# Patient Record
Sex: MALE | Race: ASIAN | NOT HISPANIC OR LATINO | Employment: OTHER | URBAN - METROPOLITAN AREA
[De-identification: names, ages, dates, MRNs, and addresses within clinical notes are randomized per-mention and may not be internally consistent; named-entity substitution may affect disease eponyms.]

---

## 2023-10-16 ENCOUNTER — HOSPITAL ENCOUNTER (INPATIENT)
Facility: CLINIC | Age: 88
LOS: 2 days | Discharge: HOME HEALTH | DRG: 871 | End: 2023-10-19
Attending: STUDENT IN AN ORGANIZED HEALTH CARE EDUCATION/TRAINING PROGRAM | Admitting: INTERNAL MEDICINE
Payer: COMMERCIAL

## 2023-10-16 ENCOUNTER — APPOINTMENT (OUTPATIENT)
Dept: CT IMAGING | Facility: CLINIC | Age: 88
DRG: 871 | End: 2023-10-16
Attending: STUDENT IN AN ORGANIZED HEALTH CARE EDUCATION/TRAINING PROGRAM
Payer: COMMERCIAL

## 2023-10-16 ENCOUNTER — APPOINTMENT (OUTPATIENT)
Dept: GENERAL RADIOLOGY | Facility: CLINIC | Age: 88
DRG: 871 | End: 2023-10-16
Attending: STUDENT IN AN ORGANIZED HEALTH CARE EDUCATION/TRAINING PROGRAM
Payer: COMMERCIAL

## 2023-10-16 DIAGNOSIS — R53.1 WEAKNESS GENERALIZED: ICD-10-CM

## 2023-10-16 DIAGNOSIS — U07.1 COVID-19: Primary | ICD-10-CM

## 2023-10-16 DIAGNOSIS — R78.81 GRAM-POSITIVE BACTEREMIA: ICD-10-CM

## 2023-10-16 PROBLEM — R32 URINARY INCONTINENCE: Status: ACTIVE | Noted: 2023-10-16

## 2023-10-16 LAB
ALBUMIN SERPL BCG-MCNC: 3.9 G/DL (ref 3.5–5.2)
ALP SERPL-CCNC: 79 U/L (ref 40–129)
ALT SERPL W P-5'-P-CCNC: 26 U/L (ref 0–70)
ANION GAP SERPL CALCULATED.3IONS-SCNC: 13 MMOL/L (ref 7–15)
AST SERPL W P-5'-P-CCNC: 29 U/L (ref 0–45)
ATRIAL RATE - MUSE: 107 BPM
BASO+EOS+MONOS # BLD AUTO: ABNORMAL 10*3/UL
BASO+EOS+MONOS NFR BLD AUTO: ABNORMAL %
BASOPHILS # BLD AUTO: 0 10E3/UL (ref 0–0.2)
BASOPHILS NFR BLD AUTO: 0 %
BILIRUB SERPL-MCNC: 0.4 MG/DL
BUN SERPL-MCNC: 30.2 MG/DL (ref 8–23)
CALCIUM SERPL-MCNC: 8.8 MG/DL (ref 8.2–9.6)
CHLORIDE SERPL-SCNC: 105 MMOL/L (ref 98–107)
CREAT SERPL-MCNC: 2.76 MG/DL (ref 0.67–1.17)
DEPRECATED HCO3 PLAS-SCNC: 21 MMOL/L (ref 22–29)
DIASTOLIC BLOOD PRESSURE - MUSE: NORMAL MMHG
EGFRCR SERPLBLD CKD-EPI 2021: 21 ML/MIN/1.73M2
EOSINOPHIL # BLD AUTO: 0 10E3/UL (ref 0–0.7)
EOSINOPHIL NFR BLD AUTO: 0 %
ERYTHROCYTE [DISTWIDTH] IN BLOOD BY AUTOMATED COUNT: 13.2 % (ref 10–15)
GLUCOSE BLDC GLUCOMTR-MCNC: 157 MG/DL (ref 70–99)
GLUCOSE BLDC GLUCOMTR-MCNC: 169 MG/DL (ref 70–99)
GLUCOSE SERPL-MCNC: 176 MG/DL (ref 70–99)
HCT VFR BLD AUTO: 37 % (ref 40–53)
HGB BLD-MCNC: 12.2 G/DL (ref 13.3–17.7)
HOLD SPECIMEN: NORMAL
IMM GRANULOCYTES # BLD: 0.1 10E3/UL
IMM GRANULOCYTES NFR BLD: 1 %
INTERPRETATION ECG - MUSE: NORMAL
LACTATE SERPL-SCNC: 1.8 MMOL/L (ref 0.7–2)
LYMPHOCYTES # BLD AUTO: 0.9 10E3/UL (ref 0.8–5.3)
LYMPHOCYTES NFR BLD AUTO: 9 %
MAGNESIUM SERPL-MCNC: 2.3 MG/DL (ref 1.7–2.3)
MCH RBC QN AUTO: 29.9 PG (ref 26.5–33)
MCHC RBC AUTO-ENTMCNC: 33 G/DL (ref 31.5–36.5)
MCV RBC AUTO: 91 FL (ref 78–100)
MONOCYTES # BLD AUTO: 0.7 10E3/UL (ref 0–1.3)
MONOCYTES NFR BLD AUTO: 8 %
NEUTROPHILS # BLD AUTO: 7.9 10E3/UL (ref 1.6–8.3)
NEUTROPHILS NFR BLD AUTO: 82 %
NRBC # BLD AUTO: 0 10E3/UL
NRBC BLD AUTO-RTO: 0 /100
P AXIS - MUSE: 51 DEGREES
PLATELET # BLD AUTO: 161 10E3/UL (ref 150–450)
POTASSIUM SERPL-SCNC: 5 MMOL/L (ref 3.4–5.3)
PR INTERVAL - MUSE: 192 MS
PROT SERPL-MCNC: 7.9 G/DL (ref 6.4–8.3)
QRS DURATION - MUSE: 70 MS
QT - MUSE: 286 MS
QTC - MUSE: 381 MS
R AXIS - MUSE: 54 DEGREES
RBC # BLD AUTO: 4.08 10E6/UL (ref 4.4–5.9)
SODIUM SERPL-SCNC: 139 MMOL/L (ref 135–145)
SYSTOLIC BLOOD PRESSURE - MUSE: NORMAL MMHG
T AXIS - MUSE: 26 DEGREES
TROPONIN T SERPL HS-MCNC: 32 NG/L
TROPONIN T SERPL HS-MCNC: 36 NG/L
VENTRICULAR RATE- MUSE: 107 BPM
WBC # BLD AUTO: 9.6 10E3/UL (ref 4–11)

## 2023-10-16 PROCEDURE — 250N000012 HC RX MED GY IP 250 OP 636 PS 637: Performed by: PHYSICIAN ASSISTANT

## 2023-10-16 PROCEDURE — 84484 ASSAY OF TROPONIN QUANT: CPT | Performed by: STUDENT IN AN ORGANIZED HEALTH CARE EDUCATION/TRAINING PROGRAM

## 2023-10-16 PROCEDURE — 83605 ASSAY OF LACTIC ACID: CPT | Performed by: STUDENT IN AN ORGANIZED HEALTH CARE EDUCATION/TRAINING PROGRAM

## 2023-10-16 PROCEDURE — 96372 THER/PROPH/DIAG INJ SC/IM: CPT | Performed by: PHYSICIAN ASSISTANT

## 2023-10-16 PROCEDURE — 99285 EMERGENCY DEPT VISIT HI MDM: CPT | Mod: 25

## 2023-10-16 PROCEDURE — 71046 X-RAY EXAM CHEST 2 VIEWS: CPT

## 2023-10-16 PROCEDURE — 99223 1ST HOSP IP/OBS HIGH 75: CPT | Performed by: PHYSICIAN ASSISTANT

## 2023-10-16 PROCEDURE — 96361 HYDRATE IV INFUSION ADD-ON: CPT

## 2023-10-16 PROCEDURE — G0378 HOSPITAL OBSERVATION PER HR: HCPCS

## 2023-10-16 PROCEDURE — 83735 ASSAY OF MAGNESIUM: CPT | Performed by: STUDENT IN AN ORGANIZED HEALTH CARE EDUCATION/TRAINING PROGRAM

## 2023-10-16 PROCEDURE — 93005 ELECTROCARDIOGRAM TRACING: CPT

## 2023-10-16 PROCEDURE — 80053 COMPREHEN METABOLIC PANEL: CPT | Performed by: STUDENT IN AN ORGANIZED HEALTH CARE EDUCATION/TRAINING PROGRAM

## 2023-10-16 PROCEDURE — 87149 DNA/RNA DIRECT PROBE: CPT | Performed by: STUDENT IN AN ORGANIZED HEALTH CARE EDUCATION/TRAINING PROGRAM

## 2023-10-16 PROCEDURE — 87077 CULTURE AEROBIC IDENTIFY: CPT | Performed by: STUDENT IN AN ORGANIZED HEALTH CARE EDUCATION/TRAINING PROGRAM

## 2023-10-16 PROCEDURE — 70450 CT HEAD/BRAIN W/O DYE: CPT

## 2023-10-16 PROCEDURE — 250N000011 HC RX IP 250 OP 636: Performed by: PHYSICIAN ASSISTANT

## 2023-10-16 PROCEDURE — 85025 COMPLETE CBC W/AUTO DIFF WBC: CPT | Performed by: STUDENT IN AN ORGANIZED HEALTH CARE EDUCATION/TRAINING PROGRAM

## 2023-10-16 PROCEDURE — 36415 COLL VENOUS BLD VENIPUNCTURE: CPT | Performed by: STUDENT IN AN ORGANIZED HEALTH CARE EDUCATION/TRAINING PROGRAM

## 2023-10-16 PROCEDURE — 82962 GLUCOSE BLOOD TEST: CPT

## 2023-10-16 PROCEDURE — 250N000013 HC RX MED GY IP 250 OP 250 PS 637: Performed by: PHYSICIAN ASSISTANT

## 2023-10-16 PROCEDURE — 258N000003 HC RX IP 258 OP 636: Performed by: STUDENT IN AN ORGANIZED HEALTH CARE EDUCATION/TRAINING PROGRAM

## 2023-10-16 RX ORDER — IPRATROPIUM BROMIDE AND ALBUTEROL SULFATE 2.5; .5 MG/3ML; MG/3ML
3 SOLUTION RESPIRATORY (INHALATION) EVERY 4 HOURS PRN
Status: DISCONTINUED | OUTPATIENT
Start: 2023-10-16 | End: 2023-10-19 | Stop reason: HOSPADM

## 2023-10-16 RX ORDER — PANTOPRAZOLE SODIUM 40 MG/1
40 TABLET, DELAYED RELEASE ORAL DAILY
Status: DISCONTINUED | OUTPATIENT
Start: 2023-10-16 | End: 2023-10-19 | Stop reason: HOSPADM

## 2023-10-16 RX ORDER — GLIPIZIDE 2.5 MG/1
5 TABLET, EXTENDED RELEASE ORAL DAILY
Status: ON HOLD | COMMUNITY
End: 2024-09-25

## 2023-10-16 RX ORDER — NICOTINE POLACRILEX 4 MG
15-30 LOZENGE BUCCAL
Status: DISCONTINUED | OUTPATIENT
Start: 2023-10-16 | End: 2023-10-19 | Stop reason: HOSPADM

## 2023-10-16 RX ORDER — LIDOCAINE 40 MG/G
CREAM TOPICAL
Status: DISCONTINUED | OUTPATIENT
Start: 2023-10-16 | End: 2023-10-19 | Stop reason: HOSPADM

## 2023-10-16 RX ORDER — SIMVASTATIN 20 MG
20 TABLET ORAL AT BEDTIME
Status: ON HOLD | COMMUNITY
End: 2024-09-25

## 2023-10-16 RX ORDER — ACETAMINOPHEN 650 MG/1
650 SUPPOSITORY RECTAL EVERY 6 HOURS PRN
Status: DISCONTINUED | OUTPATIENT
Start: 2023-10-16 | End: 2023-10-19 | Stop reason: HOSPADM

## 2023-10-16 RX ORDER — POLYETHYLENE GLYCOL 3350 17 G/17G
17 POWDER, FOR SOLUTION ORAL DAILY PRN
Status: DISCONTINUED | OUTPATIENT
Start: 2023-10-16 | End: 2023-10-19 | Stop reason: HOSPADM

## 2023-10-16 RX ORDER — ONDANSETRON 2 MG/ML
4 INJECTION INTRAMUSCULAR; INTRAVENOUS EVERY 6 HOURS PRN
Status: DISCONTINUED | OUTPATIENT
Start: 2023-10-16 | End: 2023-10-19 | Stop reason: HOSPADM

## 2023-10-16 RX ORDER — ONDANSETRON 4 MG/1
4 TABLET, ORALLY DISINTEGRATING ORAL EVERY 6 HOURS PRN
Status: DISCONTINUED | OUTPATIENT
Start: 2023-10-16 | End: 2023-10-19 | Stop reason: HOSPADM

## 2023-10-16 RX ORDER — AMOXICILLIN 250 MG
2 CAPSULE ORAL 2 TIMES DAILY PRN
Status: DISCONTINUED | OUTPATIENT
Start: 2023-10-16 | End: 2023-10-19 | Stop reason: HOSPADM

## 2023-10-16 RX ORDER — CODEINE PHOSPHATE AND GUAIFENESIN 10; 100 MG/5ML; MG/5ML
5 SOLUTION ORAL
COMMUNITY

## 2023-10-16 RX ORDER — ACETAMINOPHEN 325 MG/1
650 TABLET ORAL EVERY 6 HOURS PRN
Status: DISCONTINUED | OUTPATIENT
Start: 2023-10-16 | End: 2023-10-19 | Stop reason: HOSPADM

## 2023-10-16 RX ORDER — HEPARIN SODIUM 5000 [USP'U]/.5ML
5000 INJECTION, SOLUTION INTRAVENOUS; SUBCUTANEOUS EVERY 12 HOURS
Status: DISCONTINUED | OUTPATIENT
Start: 2023-10-16 | End: 2023-10-19 | Stop reason: HOSPADM

## 2023-10-16 RX ORDER — AMOXICILLIN 250 MG
1 CAPSULE ORAL 2 TIMES DAILY PRN
Status: DISCONTINUED | OUTPATIENT
Start: 2023-10-16 | End: 2023-10-19 | Stop reason: HOSPADM

## 2023-10-16 RX ORDER — TAMSULOSIN HYDROCHLORIDE 0.4 MG/1
0.4 CAPSULE ORAL DAILY
COMMUNITY

## 2023-10-16 RX ORDER — AMLODIPINE BESYLATE 10 MG/1
10 TABLET ORAL DAILY
Status: ON HOLD | COMMUNITY
End: 2024-09-25

## 2023-10-16 RX ORDER — LOSARTAN POTASSIUM 50 MG/1
50 TABLET ORAL DAILY
Status: ON HOLD | COMMUNITY
End: 2023-10-19

## 2023-10-16 RX ORDER — TAMSULOSIN HYDROCHLORIDE 0.4 MG/1
0.4 CAPSULE ORAL DAILY
Status: DISCONTINUED | OUTPATIENT
Start: 2023-10-16 | End: 2023-10-19 | Stop reason: HOSPADM

## 2023-10-16 RX ORDER — CODEINE PHOSPHATE AND GUAIFENESIN 10; 100 MG/5ML; MG/5ML
5 SOLUTION ORAL
Status: DISCONTINUED | OUTPATIENT
Start: 2023-10-16 | End: 2023-10-19 | Stop reason: HOSPADM

## 2023-10-16 RX ORDER — POLYETHYLENE GLYCOL 3350 17 G/17G
1 POWDER, FOR SOLUTION ORAL DAILY PRN
COMMUNITY

## 2023-10-16 RX ORDER — LOSARTAN POTASSIUM 50 MG/1
50 TABLET ORAL DAILY
Status: DISCONTINUED | OUTPATIENT
Start: 2023-10-16 | End: 2023-10-19 | Stop reason: HOSPADM

## 2023-10-16 RX ORDER — GUAIFENESIN 600 MG/1
600 TABLET, EXTENDED RELEASE ORAL 2 TIMES DAILY
Status: DISCONTINUED | OUTPATIENT
Start: 2023-10-16 | End: 2023-10-19 | Stop reason: HOSPADM

## 2023-10-16 RX ORDER — ACETAMINOPHEN 500 MG
500-1000 TABLET ORAL EVERY 4 HOURS PRN
COMMUNITY

## 2023-10-16 RX ORDER — GLIPIZIDE 5 MG/1
5 TABLET, FILM COATED, EXTENDED RELEASE ORAL DAILY
Status: DISCONTINUED | OUTPATIENT
Start: 2023-10-16 | End: 2023-10-19 | Stop reason: HOSPADM

## 2023-10-16 RX ORDER — DEXTROSE MONOHYDRATE 25 G/50ML
25-50 INJECTION, SOLUTION INTRAVENOUS
Status: DISCONTINUED | OUTPATIENT
Start: 2023-10-16 | End: 2023-10-19 | Stop reason: HOSPADM

## 2023-10-16 RX ORDER — LANSOPRAZOLE 30 MG/1
30 CAPSULE, DELAYED RELEASE ORAL DAILY
COMMUNITY

## 2023-10-16 RX ORDER — AMLODIPINE BESYLATE 10 MG/1
10 TABLET ORAL DAILY
Status: DISCONTINUED | OUTPATIENT
Start: 2023-10-16 | End: 2023-10-19 | Stop reason: HOSPADM

## 2023-10-16 RX ADMIN — HEPARIN SODIUM 5000 UNITS: 5000 INJECTION, SOLUTION INTRAVENOUS; SUBCUTANEOUS at 16:16

## 2023-10-16 RX ADMIN — SODIUM CHLORIDE 1000 ML: 9 INJECTION, SOLUTION INTRAVENOUS at 08:31

## 2023-10-16 RX ADMIN — GLIPIZIDE 5 MG: 5 TABLET, FILM COATED, EXTENDED RELEASE ORAL at 16:16

## 2023-10-16 RX ADMIN — PANTOPRAZOLE SODIUM 40 MG: 40 TABLET, DELAYED RELEASE ORAL at 16:17

## 2023-10-16 RX ADMIN — GUAIFENESIN 600 MG: 600 TABLET, EXTENDED RELEASE ORAL at 22:52

## 2023-10-16 RX ADMIN — INSULIN ASPART 1 UNITS: 100 INJECTION, SOLUTION INTRAVENOUS; SUBCUTANEOUS at 19:30

## 2023-10-16 RX ADMIN — TAMSULOSIN HYDROCHLORIDE 0.4 MG: 0.4 CAPSULE ORAL at 16:16

## 2023-10-16 RX ADMIN — LOSARTAN POTASSIUM 50 MG: 50 TABLET, FILM COATED ORAL at 16:17

## 2023-10-16 RX ADMIN — DEXAMETHASONE 6 MG: 2 TABLET ORAL at 16:17

## 2023-10-16 RX ADMIN — AMLODIPINE BESYLATE 10 MG: 10 TABLET ORAL at 16:17

## 2023-10-16 ASSESSMENT — ACTIVITIES OF DAILY LIVING (ADL)
ADLS_ACUITY_SCORE: 33
ADLS_ACUITY_SCORE: 31
ADLS_ACUITY_SCORE: 33
ADLS_ACUITY_SCORE: 35
ADLS_ACUITY_SCORE: 33
ADLS_ACUITY_SCORE: 35
ADLS_ACUITY_SCORE: 35
ADLS_ACUITY_SCORE: 33

## 2023-10-16 NOTE — ED NOTES
Daughter went to cafeteria to get pt food. MD verified that its okay. Daughter says patient has no teeth and eats soft foods.

## 2023-10-16 NOTE — ED NOTES
Pt came in with daughter at bedside. Daughter is acting as spokesperson. Pt is legally blind and needs Bel[place]ian /daughter is providing. Pt has had cough, loss of appetite, memory issues, SOB. Pt had two unwitnessed falls. Daughter said Pt usually follows a string around the house but got lost and was found on the floor this morning.

## 2023-10-16 NOTE — ED NOTES
"[facility]  ED Nurse Handoff Report    ED Chief complaint: Generalized Weakness  . ED Diagnosis:   Final diagnoses:   Weakness generalized   COVID-19       Allergies:   Allergies   Allergen Reactions    Ibuprofen Other (See Comments)       Code Status: Full Code / None listed    Activity level - Baseline/Home:  independent and uses string to get around at home, \"legally blind\" .  Activity Level - Current:   assist of 1.   Lift room needed: No.   Bariatric: No   Needed: Yes   Isolation: Yes.   Infection: Not Applicable  COVID r/o and special precautions.     Respiratory status: Room air    Vital Signs (within 30 minutes):   Vitals:    10/16/23 0930 10/16/23 0944 10/16/23 0945 10/16/23 1004   BP:  117/72  106/70   Pulse: 105 103 102 100   Resp:       Temp:       TempSrc:       SpO2: 95% 96% 95% 94%   Weight:           Cardiac Rhythm:  ,   Cardiac  Cardiac Rhythm: Normal sinus rhythm  Pain level:    Patient confused: No.   Patient Falls Risk: patient and family education.   Elimination Status:  Has not voided      Patient Report - Initial Complaint: generalized weakness.   Focused Assessment: Cardiac, respiratory     Abnormal Results:   Labs Ordered and Resulted from Time of ED Arrival to Time of ED Departure   COMPREHENSIVE METABOLIC PANEL - Abnormal       Result Value    Sodium 139      Potassium 5.0      Carbon Dioxide (CO2) 21 (*)     Anion Gap 13      Urea Nitrogen 30.2 (*)     Creatinine 2.76 (*)     GFR Estimate 21 (*)     Calcium 8.8      Chloride 105      Glucose 176 (*)     Alkaline Phosphatase 79      AST 29      ALT 26      Protein Total 7.9      Albumin 3.9      Bilirubin Total 0.4     TROPONIN T, HIGH SENSITIVITY - Abnormal    Troponin T, High Sensitivity 36 (*)    CBC WITH PLATELETS AND DIFFERENTIAL - Abnormal    WBC Count 9.6      RBC Count 4.08 (*)     Hemoglobin 12.2 (*)     Hematocrit 37.0 (*)     MCV 91      MCH 29.9      MCHC 33.0      RDW 13.2      Platelet Count " 161      % Neutrophils 82      % Lymphocytes 9      % Monocytes 8      Mids % (Monos, Eos, Basos)        % Eosinophils 0      % Basophils 0      % Immature Granulocytes 1      NRBCs per 100 WBC 0      Absolute Neutrophils 7.9      Absolute Lymphocytes 0.9      Absolute Monocytes 0.7      Mids Abs (Monos, Eos, Basos)        Absolute Eosinophils 0.0      Absolute Basophils 0.0      Absolute Immature Granulocytes 0.1      Absolute NRBCs 0.0     MAGNESIUM - Normal    Magnesium 2.3     LACTIC ACID WHOLE BLOOD - Normal    Lactic Acid 1.8     TROPONIN T, HIGH SENSITIVITY   BLOOD CULTURE   BLOOD CULTURE        XR Chest 2 Views   Preliminary Result   IMPRESSION: No acute cardiopulmonary disease.      CT Head w/o Contrast   Final Result   IMPRESSION: Diffuse cerebral volume loss and cerebral white matter   changes consistent with chronic small vessel ischemic disease. No   evidence for acute intracranial pathology.         Radiation dose for this scan was reduced using automated exposure   control, adjustment of the mA and/or kV according to patient size, or   iterative reconstruction technique      OLIVIER CARLIN MD            SYSTEM ID:  [id number]          Treatments provided: See MAR  Family Comments: Daughter at bedside  OBS brochure/video discussed/provided to patient:  Yes  ED Medications:   Medications   sodium chloride (PF) 0.9% PF flush 3 mL (has no administration in time range)   sodium chloride (PF) 0.9% PF flush 3 mL (3 mLs Intracatheter Not Given 10/16/23 0831)   sodium chloride 0.9% BOLUS 1,000 mL (0 mLs Intravenous Stopped 10/16/23 1006)       Drips infusing:  No  For the majority of the shift this patient was Green.   Interventions performed were See MAR.    Sepsis treatment initiated: No    Cares/treatment/interventions/medications to be completed following ED care:    ED Nurse Name: Gale Momokali  10:06 AM    RECEIVING UNIT ED HANDOFF REVIEW    Above ED Nurse Handoff Report was reviewed: Yes  Reviewed by:  Sherrill Russell RN on October 16, 2023 at 12:12 PM

## 2023-10-16 NOTE — ED TRIAGE NOTES
Daughter brings pt in after falling this morning d/t generalized weakness & was seen last week not feeling well after trip from [place] which he returned 10/7/23.  Pt has had cough, fever, sore throat, body aches, decreased appetite, SOB/dyspena and generalized weakness. COVID test positive 10/11/23.     Triage Assessment (Adult)       Row Name 10/16/23 0807          Triage Assessment    Airway WDL WDL        Respiratory WDL    Respiratory WDL rhythm/pattern     Rhythm/Pattern, Respiratory shortness of breath

## 2023-10-16 NOTE — PLAN OF CARE
"Respirations and BP slightly elevated, currently on RA denying SOB or pain. Endorses congestion and productive cough, on tele with sinus tach. AOx4 Serbian speaking and blind: family to remain at bedside. Utilizes urinal, moves with gait belt and walker. Elevated tropes and positive for COVID. Plan to continue obs    Problem: Adult Inpatient Plan of Care  Goal: Plan of Care Review  Description: The Plan of Care Review/Shift note should be completed every shift.  The Outcome Evaluation is a brief statement about your assessment that the patient is improving, declining, or no change.  This information will be displayed automatically on your shift  note.  Outcome: Progressing  Flowsheets (Taken 10/16/2023 2126)  Plan of Care Reviewed With:   patient   child  Overall Patient Progress: improving  Goal: Patient-Specific Goal (Individualized)  Description: You can add care plan individualizations to a care plan. Examples of Individualization might be:  \"Parent requests to be called daily at 9am for status\", \"I have a hard time hearing out of my right ear\", or \"Do not touch me to wake me up as it startles  me\".  Outcome: Progressing  Goal: Absence of Hospital-Acquired Illness or Injury  Outcome: Progressing  Intervention: Identify and Manage Fall Risk  Recent Flowsheet Documentation  Taken 10/16/2023 1345 by Sherrill Russell, RN  Safety Promotion/Fall Prevention:   safety round/check completed   supervised activity  Intervention: Prevent Infection  Recent Flowsheet Documentation  Taken 10/16/2023 1345 by Sherrill Russell, RN  Infection Prevention: hand hygiene promoted  Goal: Optimal Comfort and Wellbeing  Outcome: Progressing  Goal: Readiness for Transition of Care  Outcome: Progressing     Problem: Pulmonary Impairment  Goal: Improved Activity Tolerance  Outcome: Progressing  Goal: Effective Airway Clearance  Outcome: Progressing  Goal: Optimal Gas Exchange  Outcome: Progressing     Problem: Gas Exchange Impaired  Goal: Optimal " Gas Exchange  Outcome: Progressing     Problem: Fall Injury Risk  Goal: Absence of Fall and Fall-Related Injury  Outcome: Progressing  Intervention: Identify and Manage Contributors  Recent Flowsheet Documentation  Taken 10/16/2023 1345 by Sherrill Russell, RN  Medication Review/Management: medications reviewed  Intervention: Promote Injury-Free Environment  Recent Flowsheet Documentation  Taken 10/16/2023 1345 by Sherrill Russell, RN  Safety Promotion/Fall Prevention:   safety round/check completed   supervised activity   Goal Outcome Evaluation:      Plan of Care Reviewed With: patient, child    Overall Patient Progress: improvingOverall Patient Progress: improving

## 2023-10-16 NOTE — H&P
[facility]    History and Physical - Hospitalist Service       Date of Admission:  10/16/2023    Assessment & Plan      Jessica Linda is a 89 year old male with PMHx significant for DM type II, HTN, HLP, CKD, BPH, and is legally blind, who was admitted on 10/16/2023 with generalized weakness and falls due to Covid-19 infection. He tested positive on 10/11/23    1. Generalized weakness and falls  Covid-19 infection  Lives at home, is legally blind but uses a string to get around at home. 2 unwitnessed falls, increasingly weak.   Developed cough, sore throat, rhinorrhea, fever and SOB ~8-9 days ago, was seen in clinic on 10/11, influenza was negative, Covid returned positive but clinic was unable to get ahold of family so they were not aware of results, therefore was not started on Paxlovid.   Not hypoxic here, tachycardic. Cr is slightly above baseline at 2.76, labs otherwise unremarkable. Troponin mildly elevated but flat. CXR clear, CT head negative.   - admit to OBS  - mild expiratory wheeze noted on exam, will start Decadron even though he is not hypoxic  - supportive cares  - IS  - PT/SW consults  - Mucinex BID    2. DM type II  Managed at home with glipizide, resume. Anticipate hyperglycemia given Decadron use  - cover with sliding scale insulin  - hypoglycemia protocol     3. HTN  Resume home losartan and amlodipine with parameters. Resume Flomax  4. BPH  Resume home Flomax  5. HLP  Hold home statin while OBS  6. CKD  Cr 2.4-2.5 most recently, Cr 2.76 today. Likely mild dehydration due to acute illness. Given 1L NS in ED  - recheck BMP in AM  - avoid nephrotoxins as able.        Diet:  regular diet  DVT Prophylaxis: Heparin SQ  Silver Catheter: Not present  Lines: None     Cardiac Monitoring: None  Code Status:  full code    Clinically Significant Risk Factors Present on Admission                  # Hypertension: Noted on problem list                 Disposition Plan      Expected Discharge  Date: 10/17/2023                The patient's care was discussed with the Bedside Nurse, Patient, Patient's Family, and ED provider, Dr. Downey .    Lianet Bowser PA-C  Hospitalist Service  [facility]  Securely message with [employer] (more info)  Text page via [facility] Paging/Directory     ______________________________________________________________________    Chief Complaint   Weakness, falls    History is obtained from the patient's family    History of Present Illness   Jessica Linda is a 89 year old male with PMHx significant for DM type II, HTN, HLP, CKD, BPH, and is legally blind, who presents to the ED with weakness and several falls at home. The pt's daughters report that he developed upper respiratory sx 8-9 days ago. They were seen in urgent care and sent home with supportive cares. He tested negative for influenza at that time, Covid-19 testing did come back positive the next day. The clinic tried to contact the family to give results and likely start Paxlovid but they were unable to get a hold of family. Family did call back but the clinic was closed. He continued to get progressively weak. Continues to note sore throat, cough and SOB. He has had several falls at home that were unwitnessed. The pt is blind and gets around his home by following a string. He is normally ambulatory and able to his own ADLs.      Past Medical History    Past Medical History:   Diagnosis Date    Blindness of left eye     Constipation     Diabetes mellitus (H) 2008    Hyperlipidaemia     Hypertension     Knee pain, right        Past Surgical History   Past Surgical History:   Procedure Laterality Date    NO HISTORY OF SURGERY         Prior to Admission Medications   Prior to Admission Medications   Prescriptions Last Dose Informant Patient Reported? Taking?   LANsoprazole (PREVACID) 30 MG DR capsule 10/15/2023  Yes Yes   Sig: Take 30 mg by mouth daily   acetaminophen (TYLENOL) 500 MG tablet 10/15/2023   Yes Yes   Sig: Take 500-1,000 mg by mouth every 4 hours as needed for mild pain   amLODIPine (NORVASC) 10 MG tablet 10/15/2023  Yes Yes   Sig: Take 10 mg by mouth daily   glipiZIDE (GLUCOTROL XL) 2.5 MG 24 hr tablet 10/15/2023 at am  Yes Yes   Sig: Take 5 mg by mouth daily   guaiFENesin-codeine (ROBITUSSIN AC) 100-10 MG/5ML solution prn  Yes Yes   Sig: Take 5 mLs by mouth nightly as needed for cough   losartan (COZAAR) 50 MG tablet 10/15/2023  Yes Yes   Sig: Take 50 mg by mouth daily   polyethylene glycol (MIRALAX) 17 g packet prn  Yes Yes   Sig: Take 1 packet by mouth daily as needed for constipation   simvastatin (ZOCOR) 20 MG tablet 10/15/2023 at pm  Yes Yes   Sig: Take 20 mg by mouth at bedtime   tamsulosin (FLOMAX) 0.4 MG capsule 10/15/2023  Yes Yes   Sig: Take 0.4 mg by mouth daily      Facility-Administered Medications: None           Physical Exam   Vital Signs: Temp: 98.9  F (37.2  C) Temp src: Temporal BP: 136/63 Pulse: 110   Resp: 24 SpO2: 96 % O2 Device: None (Room air)    Weight: 132 lbs 4.42 oz    GENERAL:  Comfortable.  PSYCH: pleasant, oriented, No acute distress.  HEENT:  Atraumatic, normocephalic. Normal conjunctiva, normal hearing, and oropharynx is normal.  NECK:  Supple, no neck vein distention,  HEART:  Normal S1, S2 with no murmur, no pericardial rub, gallops or S3 or S4.  LUNGS:  slight expiratory wheeze noted bilaterally, otherwise clear to auscultation, normal Respiratory effort. No rales or rhonchi.  GI:  Soft, normal bowel sounds. Non-tender, non distended.   EXTREMITIES:  No pedal edema, +2 pulses bilateral and equal.  SKIN:  Dry to touch, No rash, wound or ulcerations.  NEUROLOGIC:  CN 2-12 intact, BL 5/5 symmetric upper and lower extremity strength, sensation is intact with no focal deficits.      Medical Decision Making       60 MINUTES SPENT BY ME on the date of service doing chart review, history, exam, documentation & further activities per the note.      Data     I have  personally reviewed the following data over the past 24 hrs:    9.6  \   12.2 (L)   / 161     139 105 30.2 (H) /  169 (H)   5.0 21 (L) 2.76 (H) \     ALT: 26 AST: 29 AP: 79 TBILI: 0.4   ALB: 3.9 TOT PROTEIN: 7.9 LIPASE: N/A     Trop: 32 (H) BNP: N/A     Procal: N/A CRP: N/A Lactic Acid: 1.8         Imaging results reviewed over the past 24 hrs:   Recent Results (from the past 24 hour(s))   CT Head w/o Contrast    Narrative    CT OF THE HEAD WITHOUT CONTRAST 10/16/2023 9:04 AM     COMPARISON: None    HISTORY: Fall. Trauma. Pain.    TECHNIQUE: 5 mm thick axial CT images of the head were acquired  without IV contrast material.    FINDINGS: There is moderate diffuse cerebral volume loss. There are  subtle patchy areas of decreased density in the cerebral white matter  bilaterally that are consistent with sequela of chronic small vessel  ischemic disease.    The ventricles and basal cisterns are within normal limits in  configuration given the degree of cerebral volume loss.  There is no  midline shift. There are no extra-axial fluid collections.    No intracranial hemorrhage, mass or recent infarct.    The visualized paranasal sinuses are well-aerated. There is no  mastoiditis. There are no fractures of the visualized bones.      Impression    IMPRESSION: Diffuse cerebral volume loss and cerebral white matter  changes consistent with chronic small vessel ischemic disease. No  evidence for acute intracranial pathology.      Radiation dose for this scan was reduced using automated exposure  control, adjustment of the mA and/or kV according to patient size, or  iterative reconstruction technique    OLIVIER CARLIN MD         SYSTEM ID:  [id number]   XR Chest 2 Views    Narrative    XR CHEST TWO VIEWS   10/16/2023 9:15 AM     HISTORY: Shortness of breath    COMPARISON: 3/19/2009.      Impression    IMPRESSION: No acute cardiopulmonary disease.    REMBERTO SMITH MD         SYSTEM ID:  [id number]

## 2023-10-16 NOTE — ED PROVIDER NOTES
History   Chief Complaint:  Generalized Weakness       HPI:  Jessica Linda is a pleasant 89 year old male presenting with generalized weakness.  He has had symptoms for about 9 days.  He returned from a trip to [place] on 10/7/2023 with cough, nasal congestion and fatigue.  He was evaluated in clinic on 10/11/2023.  He was tested for influenza and COVID.  He tested positive for COVID.  He did not receive Paxlovid.  In the last 5 days, the patient has had worsening symptoms.  He has had 2 falls in the last 5 days.  He has worsening shortness of breath cough.  He also has fevers.  He is also has decreased appetite and has lost 3 to 4 pounds according to his daughter.  He is generally weak and unable to perform the day-to-day activities he previously had no problem.  Patient is legally blind.    The patient declined use of a professional interpretor and requested use of a family member to act as their Macedonian-language interpretor.     Independent Historian:   Daughter - They report most of the history above    Review of External Notes:   I personally reviewed notes from the patient's clinic visit dated 10/11/2023. This provided me with information regarding understanding of symptoms at that point.     Medications:    No current outpatient medications on file.      Past Medical History:    Past Medical History:   Diagnosis Date    Blindness of left eye     Constipation     Diabetes mellitus (H) 2008    Hyperlipidaemia     Hypertension     Knee pain, right        Past Surgical History:    Past Surgical History:   Procedure Laterality Date    NO HISTORY OF SURGERY          Physical Exam   Patient Vitals for the past 24 hrs:   BP Temp Temp src Pulse Resp SpO2 Weight   10/16/23 1300 (!) 156/80 98.4  F (36.9  C) Axillary 105 20 94 % --   10/16/23 1159 136/63 -- -- 110 -- 96 % --   10/16/23 1129 137/81 -- -- 100 -- 95 % --   10/16/23 1100 128/79 -- -- 103 -- 95 % --   10/16/23 1026 113/70 -- -- 100 -- 95 % --   10/16/23  1004 106/70 -- -- 100 -- 94 % --   10/16/23 0945 -- -- -- 102 -- 95 % --   10/16/23 0944 117/72 -- -- 103 -- 96 % --   10/16/23 0930 -- -- -- 105 -- 95 % --   10/16/23 0845 -- -- -- 109 -- 96 % --   10/16/23 0830 -- -- -- 109 -- 96 % --   10/16/23 0821 (!) 144/88 -- -- 115 -- 96 % --   10/16/23 0807 137/76 98.9  F (37.2  C) Temporal (!) 127 24 95 % 60 kg (132 lb 4.4 oz)      Physical Exam  Vitals and nursing note reviewed.   HENT:      Mouth/Throat:      Mouth: Mucous membranes are dry.   Cardiovascular:      Rate and Rhythm: Regular rhythm. Tachycardia present.      Heart sounds: Normal heart sounds.   Pulmonary:      Effort: Pulmonary effort is normal.      Breath sounds: Wheezing present.   Abdominal:      General: Abdomen is flat.      Palpations: Abdomen is soft.      Tenderness: There is no abdominal tenderness.   Musculoskeletal:         General: Normal range of motion.      Right lower leg: No edema.      Left lower leg: No edema.   Skin:     General: Skin is warm and dry.      Coloration: Skin is not pale.      Findings: No erythema.   Neurological:      Mental Status: He is alert and oriented to person, place, and time.      Motor: No weakness.            Emergency Department Course   ECG  ECG results from 10/16/23   EKG 12-lead, tracing only     Value    Systolic Blood Pressure     Diastolic Blood Pressure     Ventricular Rate 107    Atrial Rate 107    MA Interval 192    QRS Duration 70        QTc 381    P Axis 51    R AXIS 54    T Axis 26    Interpretation ECG      Sinus tachycardia  Nonspecific ST and T wave abnormality  Abnormal ECG  When compared with ECG of 19-MAR-2009 20:23,  Nonspecific T wave abnormality, worse in Inferior leads  Nonspecific T wave abnormality now evident in Lateral leads  Confirmed by - EMERGENCY ROOM, PHYSICIAN ([id number]),  CORRINA ALEMAN ([id number]) on 10/16/2023 11:46:21 AM          Imaging:  XR Chest 2 Views   Preliminary Result   IMPRESSION: No acute cardiopulmonary  disease.      CT Head w/o Contrast   Final Result   IMPRESSION: Diffuse cerebral volume loss and cerebral white matter   changes consistent with chronic small vessel ischemic disease. No   evidence for acute intracranial pathology.         Radiation dose for this scan was reduced using automated exposure   control, adjustment of the mA and/or kV according to patient size, or   iterative reconstruction technique      OLIVIER CARLIN MD            SYSTEM ID:  [id number]         Report per radiology    Laboratory:  Labs Ordered and Resulted from Time of ED Arrival to Time of ED Departure   COMPREHENSIVE METABOLIC PANEL - Abnormal       Result Value    Sodium 139      Potassium 5.0      Carbon Dioxide (CO2) 21 (*)     Anion Gap 13      Urea Nitrogen 30.2 (*)     Creatinine 2.76 (*)     GFR Estimate 21 (*)     Calcium 8.8      Chloride 105      Glucose 176 (*)     Alkaline Phosphatase 79      AST 29      ALT 26      Protein Total 7.9      Albumin 3.9      Bilirubin Total 0.4     TROPONIN T, HIGH SENSITIVITY - Abnormal    Troponin T, High Sensitivity 36 (*)    CBC WITH PLATELETS AND DIFFERENTIAL - Abnormal    WBC Count 9.6      RBC Count 4.08 (*)     Hemoglobin 12.2 (*)     Hematocrit 37.0 (*)     MCV 91      MCH 29.9      MCHC 33.0      RDW 13.2      Platelet Count 161      % Neutrophils 82      % Lymphocytes 9      % Monocytes 8      Mids % (Monos, Eos, Basos)        % Eosinophils 0      % Basophils 0      % Immature Granulocytes 1      NRBCs per 100 WBC 0      Absolute Neutrophils 7.9      Absolute Lymphocytes 0.9      Absolute Monocytes 0.7      Mids Abs (Monos, Eos, Basos)        Absolute Eosinophils 0.0      Absolute Basophils 0.0      Absolute Immature Granulocytes 0.1      Absolute NRBCs 0.0     TROPONIN T, HIGH SENSITIVITY - Abnormal    Troponin T, High Sensitivity 32 (*)    MAGNESIUM - Normal    Magnesium 2.3     LACTIC ACID WHOLE BLOOD - Normal    Lactic Acid 1.8     BLOOD CULTURE   BLOOD CULTURE        Procedures    None performed       Emergency Department Course & Assessments:             Interventions:  Medications   sodium chloride (PF) 0.9% PF flush 3 mL (has no administration in time range)   sodium chloride (PF) 0.9% PF flush 3 mL (3 mLs Intracatheter Not Given 10/16/23 0831)   sodium chloride 0.9% BOLUS 1,000 mL (0 mLs Intravenous Stopped 10/16/23 1006)        Assessments:  0820 I obtained history and performed initial evaluation    Independent Interpretation (X-rays, CTs, rhythm strip):  I independently interpreted the patient's chest x-ray; reassuring against infiltrate  I independently interpreted the patient's non-contrast head CT; reassuring against acute intracranial hemorrhage.    Consultations/Discussion of Management or Tests:  I discussed the patient's presentation, workup, assessment, plan and disposition with hospitalist Lianet CORTEZ on behalf of Dr. Song.    Social Determinants of Health affecting care:   None.      Disposition:  The patient was admitted to the hospital under the care of Dr. Song.     Impression & Plan   CMS Diagnoses: None       Medical Decision Making:       Critical Care time was 0 minutes for this patient excluding procedures.   Elderly male patient presenting with generalized weakness, known COVID-positive status, shortness of breath, fall.  Vital signs notable for tachycardia and tachypnea on arrival.  Positive SIRS criteria.  Considered differential including COVID syndrome, bacterial pneumonia, sepsis, bacteremia, acute coronary syndrome, among others.  Did evaluate the patient for intracranial hemorrhage given fall.  CT was reassuring.  Patient lactate was within normal limits.  We did obtain blood cultures.  Overall impression is that patient's symptomology is likely secondary to COVID rather than invasive bacterial infection.  Did not give antibiotics at this time.  Chest x-ray is reassuring against pneumonia.  No leukocytosis or neutrophilia.  EKG was reassuring against  acute myocardial ischemia and patient's troponin was slightly elevated but flat on repeat.  His renal function is poor.  Unknown baseline since we do not have labs recently.  I did give him a bolus of fluid since he appeared clinically dry on exam.  Patient will be registered to observation in the hospital for further evaluation and management.    Diagnosis:    ICD-10-CM    1. COVID-19  U07.1       2. Weakness generalized  R53.1            Discharge Medications:  Current Discharge Medication List            Cleve Downey MD  10/16/23 8348

## 2023-10-16 NOTE — PHARMACY-ADMISSION MEDICATION HISTORY
Pharmacist Admission Medication History    Admission medication history is complete. The information provided in this note is only as accurate as the sources available at the time of the update.    Information Source(s): Family member and CareEverywhere/SureScripts via phone    Pertinent Information:      Changes made to PTA medication list:  Added: Tylenol, Amlodipine, Glipizide, Lansoprazole, Losartan, Flomax & Robitussin-Codeine   Deleted:  Tylenol/Codein, Jacoby, Trilipix, Anusol-HC, Metformin, Temazepam & duplicate Simvastatin.  Changed:  Scheduled Miralax-->prn    Medication Affordability:  Not including over the counter (OTC) medications, was there a time in the past 3 months when you did not take your medications as prescribed because of cost?: No    Allergies reviewed with patient and updates made in EHR: yes    Medication History Completed By: Clare Chow RPH 10/16/2023 12:00 PM    PTA Med List   Medication Sig Last Dose    acetaminophen (TYLENOL) 500 MG tablet Take 500-1,000 mg by mouth every 4 hours as needed for mild pain 10/15/2023    amLODIPine (NORVASC) 10 MG tablet Take 10 mg by mouth daily 10/15/2023    glipiZIDE (GLUCOTROL XL) 2.5 MG 24 hr tablet Take 5 mg by mouth daily 10/15/2023 at am    guaiFENesin-codeine (ROBITUSSIN AC) 100-10 MG/5ML solution Take 5 mLs by mouth nightly as needed for cough prn    LANsoprazole (PREVACID) 30 MG DR capsule Take 30 mg by mouth daily 10/15/2023    losartan (COZAAR) 50 MG tablet Take 50 mg by mouth daily 10/15/2023    polyethylene glycol (MIRALAX) 17 g packet Take 1 packet by mouth daily as needed for constipation prn    simvastatin (ZOCOR) 20 MG tablet Take 20 mg by mouth at bedtime 10/15/2023 at pm    tamsulosin (FLOMAX) 0.4 MG capsule Take 0.4 mg by mouth daily 10/15/2023

## 2023-10-17 ENCOUNTER — APPOINTMENT (OUTPATIENT)
Dept: PHYSICAL THERAPY | Facility: CLINIC | Age: 88
DRG: 871 | End: 2023-10-17
Attending: PHYSICIAN ASSISTANT
Payer: COMMERCIAL

## 2023-10-17 LAB
ANION GAP SERPL CALCULATED.3IONS-SCNC: 12 MMOL/L (ref 7–15)
BASO+EOS+MONOS # BLD AUTO: ABNORMAL 10*3/UL
BASO+EOS+MONOS NFR BLD AUTO: ABNORMAL %
BASOPHILS # BLD AUTO: 0 10E3/UL (ref 0–0.2)
BASOPHILS NFR BLD AUTO: 0 %
BUN SERPL-MCNC: 36 MG/DL (ref 8–23)
CALCIUM SERPL-MCNC: 8.4 MG/DL (ref 8.2–9.6)
CHLORIDE SERPL-SCNC: 106 MMOL/L (ref 98–107)
CREAT SERPL-MCNC: 2.7 MG/DL (ref 0.67–1.17)
DEPRECATED HCO3 PLAS-SCNC: 19 MMOL/L (ref 22–29)
EGFRCR SERPLBLD CKD-EPI 2021: 22 ML/MIN/1.73M2
ENTEROCOCCUS FAECALIS: NOT DETECTED
ENTEROCOCCUS FAECIUM: NOT DETECTED
EOSINOPHIL # BLD AUTO: 0 10E3/UL (ref 0–0.7)
EOSINOPHIL NFR BLD AUTO: 0 %
ERYTHROCYTE [DISTWIDTH] IN BLOOD BY AUTOMATED COUNT: 13.2 % (ref 10–15)
GLUCOSE BLDC GLUCOMTR-MCNC: 156 MG/DL (ref 70–99)
GLUCOSE BLDC GLUCOMTR-MCNC: 182 MG/DL (ref 70–99)
GLUCOSE BLDC GLUCOMTR-MCNC: 183 MG/DL (ref 70–99)
GLUCOSE BLDC GLUCOMTR-MCNC: 274 MG/DL (ref 70–99)
GLUCOSE SERPL-MCNC: 266 MG/DL (ref 70–99)
HCT VFR BLD AUTO: 34.6 % (ref 40–53)
HGB BLD-MCNC: 11.2 G/DL (ref 13.3–17.7)
IMM GRANULOCYTES # BLD: 0 10E3/UL
IMM GRANULOCYTES NFR BLD: 1 %
LISTERIA SPECIES (DETECTED/NOT DETECTED): NOT DETECTED
LYMPHOCYTES # BLD AUTO: 0.6 10E3/UL (ref 0.8–5.3)
LYMPHOCYTES NFR BLD AUTO: 10 %
MCH RBC QN AUTO: 29.9 PG (ref 26.5–33)
MCHC RBC AUTO-ENTMCNC: 32.4 G/DL (ref 31.5–36.5)
MCV RBC AUTO: 92 FL (ref 78–100)
MONOCYTES # BLD AUTO: 0.2 10E3/UL (ref 0–1.3)
MONOCYTES NFR BLD AUTO: 4 %
NEUTROPHILS # BLD AUTO: 5 10E3/UL (ref 1.6–8.3)
NEUTROPHILS NFR BLD AUTO: 85 %
NRBC # BLD AUTO: 0 10E3/UL
NRBC BLD AUTO-RTO: 0 /100
PLATELET # BLD AUTO: 159 10E3/UL (ref 150–450)
POTASSIUM SERPL-SCNC: 5.4 MMOL/L (ref 3.4–5.3)
RBC # BLD AUTO: 3.75 10E6/UL (ref 4.4–5.9)
SODIUM SERPL-SCNC: 137 MMOL/L (ref 135–145)
STAPHYLOCOCCUS AUREUS: NOT DETECTED
STAPHYLOCOCCUS EPIDERMIDIS: NOT DETECTED
STAPHYLOCOCCUS LUGDUNENSIS: NOT DETECTED
STAPHYLOCOCCUS SPECIES: NOT DETECTED
STREPTOCOCCUS AGALACTIAE: NOT DETECTED
STREPTOCOCCUS ANGINOSUS GROUP: NOT DETECTED
STREPTOCOCCUS PNEUMONIAE: NOT DETECTED
STREPTOCOCCUS PYOGENES: NOT DETECTED
STREPTOCOCCUS SPECIES: DETECTED
WBC # BLD AUTO: 5.9 10E3/UL (ref 4–11)

## 2023-10-17 PROCEDURE — 85025 COMPLETE CBC W/AUTO DIFF WBC: CPT | Performed by: PHYSICIAN ASSISTANT

## 2023-10-17 PROCEDURE — G0378 HOSPITAL OBSERVATION PER HR: HCPCS

## 2023-10-17 PROCEDURE — 99233 SBSQ HOSP IP/OBS HIGH 50: CPT | Performed by: INTERNAL MEDICINE

## 2023-10-17 PROCEDURE — 97161 PT EVAL LOW COMPLEX 20 MIN: CPT | Mod: GP | Performed by: PHYSICAL THERAPIST

## 2023-10-17 PROCEDURE — 250N000013 HC RX MED GY IP 250 OP 250 PS 637: Performed by: PHYSICIAN ASSISTANT

## 2023-10-17 PROCEDURE — 96374 THER/PROPH/DIAG INJ IV PUSH: CPT

## 2023-10-17 PROCEDURE — 36415 COLL VENOUS BLD VENIPUNCTURE: CPT | Performed by: INTERNAL MEDICINE

## 2023-10-17 PROCEDURE — 120N000001 HC R&B MED SURG/OB

## 2023-10-17 PROCEDURE — 80048 BASIC METABOLIC PNL TOTAL CA: CPT | Performed by: PHYSICIAN ASSISTANT

## 2023-10-17 PROCEDURE — 87040 BLOOD CULTURE FOR BACTERIA: CPT | Performed by: INTERNAL MEDICINE

## 2023-10-17 PROCEDURE — 250N000011 HC RX IP 250 OP 636: Mod: JZ | Performed by: HOSPITALIST

## 2023-10-17 PROCEDURE — 250N000012 HC RX MED GY IP 250 OP 636 PS 637: Performed by: PHYSICIAN ASSISTANT

## 2023-10-17 PROCEDURE — 96375 TX/PRO/DX INJ NEW DRUG ADDON: CPT

## 2023-10-17 PROCEDURE — 250N000011 HC RX IP 250 OP 636: Performed by: PHYSICIAN ASSISTANT

## 2023-10-17 PROCEDURE — 36415 COLL VENOUS BLD VENIPUNCTURE: CPT | Performed by: PHYSICIAN ASSISTANT

## 2023-10-17 PROCEDURE — 250N000011 HC RX IP 250 OP 636: Mod: JZ | Performed by: INTERNAL MEDICINE

## 2023-10-17 PROCEDURE — 96372 THER/PROPH/DIAG INJ SC/IM: CPT | Performed by: PHYSICIAN ASSISTANT

## 2023-10-17 PROCEDURE — 82962 GLUCOSE BLOOD TEST: CPT

## 2023-10-17 RX ORDER — VANCOMYCIN HYDROCHLORIDE 1 G/200ML
1000 INJECTION, SOLUTION INTRAVENOUS ONCE
Status: COMPLETED | OUTPATIENT
Start: 2023-10-17 | End: 2023-10-17

## 2023-10-17 RX ORDER — CEFTRIAXONE 2 G/1
2 INJECTION, POWDER, FOR SOLUTION INTRAMUSCULAR; INTRAVENOUS EVERY 24 HOURS
Status: DISCONTINUED | OUTPATIENT
Start: 2023-10-17 | End: 2023-10-19 | Stop reason: HOSPADM

## 2023-10-17 RX ADMIN — VANCOMYCIN HYDROCHLORIDE 1000 MG: 1 INJECTION, SOLUTION INTRAVENOUS at 02:10

## 2023-10-17 RX ADMIN — GUAIFENESIN 600 MG: 600 TABLET, EXTENDED RELEASE ORAL at 21:34

## 2023-10-17 RX ADMIN — CEFTRIAXONE 2 G: 2 INJECTION, POWDER, FOR SOLUTION INTRAMUSCULAR; INTRAVENOUS at 14:40

## 2023-10-17 RX ADMIN — DEXAMETHASONE 6 MG: 2 TABLET ORAL at 08:20

## 2023-10-17 RX ADMIN — PANTOPRAZOLE SODIUM 40 MG: 40 TABLET, DELAYED RELEASE ORAL at 08:19

## 2023-10-17 RX ADMIN — HEPARIN SODIUM 5000 UNITS: 5000 INJECTION, SOLUTION INTRAVENOUS; SUBCUTANEOUS at 14:50

## 2023-10-17 RX ADMIN — INSULIN ASPART 3 UNITS: 100 INJECTION, SOLUTION INTRAVENOUS; SUBCUTANEOUS at 17:07

## 2023-10-17 RX ADMIN — GLIPIZIDE 5 MG: 5 TABLET, FILM COATED, EXTENDED RELEASE ORAL at 08:19

## 2023-10-17 RX ADMIN — INSULIN ASPART 1 UNITS: 100 INJECTION, SOLUTION INTRAVENOUS; SUBCUTANEOUS at 11:12

## 2023-10-17 RX ADMIN — INSULIN ASPART 3 UNITS: 100 INJECTION, SOLUTION INTRAVENOUS; SUBCUTANEOUS at 08:07

## 2023-10-17 RX ADMIN — TAMSULOSIN HYDROCHLORIDE 0.4 MG: 0.4 CAPSULE ORAL at 08:19

## 2023-10-17 RX ADMIN — GUAIFENESIN 600 MG: 600 TABLET, EXTENDED RELEASE ORAL at 08:19

## 2023-10-17 RX ADMIN — AMLODIPINE BESYLATE 10 MG: 10 TABLET ORAL at 08:19

## 2023-10-17 RX ADMIN — HEPARIN SODIUM 5000 UNITS: 5000 INJECTION, SOLUTION INTRAVENOUS; SUBCUTANEOUS at 02:11

## 2023-10-17 ASSESSMENT — ACTIVITIES OF DAILY LIVING (ADL)
ADLS_ACUITY_SCORE: 37
ADLS_ACUITY_SCORE: 33
ADLS_ACUITY_SCORE: 37
DEPENDENT_IADLS:: CLEANING;COOKING;LAUNDRY;SHOPPING;MEAL PREPARATION;TRANSPORTATION
ADLS_ACUITY_SCORE: 37

## 2023-10-17 NOTE — PROGRESS NOTES
"PRIMARY DIAGNOSIS: \"GENERIC\" NURSING  OUTPATIENT/OBSERVATION GOALS TO BE MET BEFORE DISCHARGE:  ADLs back to baseline: Yes    Activity and level of assistance: Up with standby assistance.    Pain status: Pain free.    Return to near baseline physical activity: Yes     Discharge Planner Nurse   Safe discharge environment identified: Yes  Barriers to discharge: YES: IV abx       Entered by: Sherrill Russell RN 10/17/2023 1:22 PM   Family reports pt appears to be back at baseline and pt states he is feeling much better/stronger.   Please review provider order for any additional goals.   Nurse to notify provider when observation goals have been met and patient is ready for discharge.  "

## 2023-10-17 NOTE — PLAN OF CARE
PRIMARY DIAGNOSIS: COVID  OUTPATIENT/OBSERVATION GOALS TO BE MET BEFORE DISCHARGE:  Dyspnea improved and O2 sats >88% on RA or back to baseline O2 levels:  Dyspnea on exertion observed, pt denies SOB when asked    SpO2: 95 %, O2 Device: None (Room air)    Tolerating oral abx or appropriate plans made outpatient infusion: Yes    Vitals signs normal or return to baseline: Yes    Short term supplemental O2 needed with activity at home: No    Tolerate oral intake to maintain hydration:  Poor oral intake to maintain hydration, needs encouragement    Return to near baseline physical activity:  weakness present, 2 assist with walker to transfer    Discharge Planner Nurse   Safe discharge environment identified: No  Barriers to discharge: Yes       Entered by: Janel Bello RN 10/16/2023 7:53 PM     Please review provider order for any additional goals.   Nurse to notify provider when observation goals have been met and patient is ready for discharge.

## 2023-10-17 NOTE — PROGRESS NOTES
[facility]  Hospitalist Progress Note  Abhay Sawyer MD 10/17/2023    Reason for Stay (Diagnosis): weakness, COVID, streptococcal bacteremia         Assessment and Plan:      Summary of Stay: Jessica Linda is a 89 year old male with PMHx significant for DM type II, HTN, HLP, CKD, BPH, and is legally blind, who was admitted on 10/16/2023 with generalized weakness and falls due to Covid-19 infection. He tested positive on 10/11/23    Course has been complicated by blood cx (1/2) positive for streptococcal species    Plan:  - change antibiotic from vanco to Rocephin  - repeat blood cx to document clearance  - may need ID involvement for consideration of outpatient IV antibiotic course, pending the strain of strep that is isolated     1. Acute COVID infection  - Developed cough, sore throat, rhinorrhea, fever and SOB ~8-9 days ago, was seen in clinic on 10/11, influenza was negative, Covid returned positive but clinic was unable to contact family so they were not aware of results, therefore was not started on Paxlovid.   - Not hypoxic   - CXR clear  - admit to OBS  - on dexamethasone for RAD exacerbation/wheeze   - supportive cares  - IS  - PT/SW consults  - Mucinex BID    2.  Streptococcal bacteremia  - 1/2 blood cx positive  - await speciation and sensitivities  - treat with IV Rocephin  - no localizing sx beyond recent resp sx attributed to COVID  - CXR neg  - no e/o cellulitis or pharyngitis on exam  - repeat blood cx to document clearance  - may need ID consult and outpt IV antibiotic course, pending strain of strep that is isolated     2. DM type II  - Managed at home with glipizide, resumed.   - Anticipate hyperglycemia given Decadron use  - cover with sliding scale insulin  - hypoglycemia protocol      3. HTN  - hold losartan due to mild hyperkalemia in setting of CKD  - continue amlodipine with parameters.    4. BPH  Resume home Flomax    5. HLP      6. CKD  - baseline Cr 2.4-2.5       7.   Social  - Lives at home, is legally blind but uses a string to get around at home.         Diet:  regular diet  DVT Prophylaxis: Heparin SQ  Silver Catheter: Not present  Lines: None     Cardiac Monitoring: None  Code Status:  full code          Interval History (Subjective):      Pt feels well.  No complaints.  No pain.  No localizing sx.  Dtr at bedside interpreting                  Physical Exam:      Last Vital Signs:  BP (!) 151/87 (BP Location: Right arm, Patient Position: Sitting)   Pulse 93   Temp 98.6  F (37  C) (Oral)   Resp 20   Wt 60 kg (132 lb 4.4 oz)   SpO2 96%       Intake/Output Summary (Last 24 hours) at 10/17/2023 1108  Last data filed at 10/16/2023 1824  Gross per 24 hour   Intake 243 ml   Output 535 ml   Net -292 ml       Constitutional: Awake, alert, cooperative, no apparent distress     Respiratory: Clear to auscultation bilaterally, no crackles or wheezing   Cardiovascular: Regular rate and rhythm, normal S1 and S2, and no murmur noted   Abdomen: Normal bowel sounds, soft, non-distended, non-tender   Skin: No rashes, no cyanosis, dry to touch   Neuro: Alert and oriented x3, no weakness, numbness, memory loss   Extremities: No edema, normal range of motion   Other(s): Throat - no pharyngeal exudate or redness       All other systems: Negative          Medications:      All current medications were reviewed with changes reflected in problem list.         Data:      All new lab and imaging data was reviewed.   Labs:  Recent Labs   Lab 10/17/23  0655   WBC 5.9   HGB 11.2*   HCT 34.6*   MCV 92         Imaging:   No results found for this or any previous visit (from the past 24 hour(s)).

## 2023-10-17 NOTE — PROGRESS NOTES
XC    BC with GPC pairs/chains, possible strep or enterococcus? Will start IV vanco, narrow abx when speciation completed.    Jadon Strong, DO

## 2023-10-17 NOTE — PLAN OF CARE
"VSS on RA, denies pain or SOB, Tele discontinued. Infrequent productive cough, regular diet, moves to bathroom with walker and gaitbelt A1. Family remains at bedside for translation as pt is Cambodian speaking and legally blind. Blood cultures show streptococcal, IV abx switched from vanco to rocephin. Plan is to review strain of strep once isolated from today's blood collection.     Goal Outcome Evaluation:   Plan of Care Reviewed With: patient, child Overall Patient Progress: improving    Problem: Adult Inpatient Plan of Care  Goal: Plan of Care Review  Description: The Plan of Care Review/Shift note should be completed every shift.  The Outcome Evaluation is a brief statement about your assessment that the patient is improving, declining, or no change.  This information will be displayed automatically on your shift  note.  Outcome: Progressing  Goal: Patient-Specific Goal (Individualized)  Description: You can add care plan individualizations to a care plan. Examples of Individualization might be:  \"Parent requests to be called daily at 9am for status\", \"I have a hard time hearing out of my right ear\", or \"Do not touch me to wake me up as it startles  me\".  Outcome: Progressing  Goal: Absence of Hospital-Acquired Illness or Injury  Outcome: Progressing  Intervention: Identify and Manage Fall Risk  Recent Flowsheet Documentation  Taken 10/17/2023 0816 by Sherrill Russell, RN  Safety Promotion/Fall Prevention:   safety round/check completed   bedside attendant   supervised activity  Intervention: Prevent Skin Injury  Recent Flowsheet Documentation  Taken 10/17/2023 0816 by Sherrill Russell, RN  Body Position: position changed independently  Goal: Optimal Comfort and Wellbeing  Outcome: Progressing  Goal: Readiness for Transition of Care  Outcome: Progressing     Problem: Pulmonary Impairment  Goal: Improved Activity Tolerance  Outcome: Progressing  Goal: Effective Airway Clearance  Outcome: Progressing  Goal: Optimal Gas " Exchange  Outcome: Progressing     Problem: Gas Exchange Impaired  Goal: Optimal Gas Exchange  Outcome: Progressing     Problem: Fall Injury Risk  Goal: Absence of Fall and Fall-Related Injury  Outcome: Progressing  Intervention: Promote Injury-Free Environment  Recent Flowsheet Documentation  Taken 10/17/2023 0816 by Sherrill Russell, RN  Safety Promotion/Fall Prevention:   safety round/check completed   bedside attendant   supervised activity

## 2023-10-17 NOTE — UTILIZATION REVIEW
Admission Status; Secondary Review Determination         Under the authority of the Utilization Management Committee, the utilization review process indicated a secondary review on the above patient.  The review outcome is based on review of the medical records, discussions with staff, and applying clinical experience noted on the date of the review.        (x)      Inpatient Status Appropriate - This patient's medical care is consistent with medical management for inpatient care and reasonable inpatient medical practice.          RATIONALE FOR DETERMINATION   The patient is a 89-year-old male admitted on 10/16/2023.  Patient had a diagnosis of COVID-19 on 10/11/2023.  The patient was admitted for generalized weakness and falls with underlying COVID-19 infection.    Blood culture positive for streptococcal species and was initially started on vancomycin IV and switched to Rocephin IV which is continuing until sensitivities return.  Repeat blood cultures are also being done.  Patient was not started on Paxlovid because of difficulty reaching the family within 5 days of his diagnosis.  He also has diabetes mellitus type 2.  Based on possible sepsis and need for IV antibiotic treatment, recommend switching from observation to inpatient status.  Dr. Abhay Nolen and will be notified of this recommendation..      The severity of illness, intensity of service provided, expected LOS and risk for adverse outcome make the care complex, high risk and appropriate for hospital admission.        The information on this document is developed by the utilization review team in order for the business office to ensure compliance.  This only denotes the appropriateness of proper admission status and does not reflect the quality of care rendered.         The definitions of Inpatient Status and Observation Status used in making the determination above are those provided in the CMS Coverage Manual, Chapter 1 and Chapter 6, section  70.4.      Sincerely,     Sharad Deras MD  Physician Advisor  Utilization Review/ Case Management  [facility].

## 2023-10-17 NOTE — PLAN OF CARE
Goal Outcome Evaluation:    AO x4. Lao speaking. Denies pain. Family at bedside. B, 157. Prophylactic sacral mepilex. Urinal in reach. Assist x2 with gb and walker. Plan: Continue POC.

## 2023-10-17 NOTE — CONSULTS
Care Management Initial Consult    General Information  Assessment completed with: Luzma Olsen  Type of CM/SW Visit: Initial Assessment    Primary Care Provider verified and updated as needed: Yes   Readmission within the last 30 days: no previous admission in last 30 days      Reason for Consult: discharge planning  Advance Care Planning:            Communication Assessment  Patient's communication style: spoken language (non-English)    Hearing Difficulty or Deaf: no   Wear Glasses or Blind: yes (blind)    Cognitive  Cognitive/Neuro/Behavioral: WDL                      Living Environment:   People in home: child(terrence), adult     Current living Arrangements: house      Able to return to prior arrangements: yes       Family/Social Support:  Care provided by: self, child(terrence)  Provides care for: no one, unable/limited ability to care for self  Marital Status: Single  Children          Description of Support System: Supportive, Involved    Support Assessment: Adequate family and caregiver support    Current Resources:   Patient receiving home care services:       Community Resources:    Equipment currently used at home:    Supplies currently used at home:      Employment/Financial:  Employment Status:          Financial Concerns:             Does the patient's insurance plan have a 3 day qualifying hospital stay waiver?  Yes     Which insurance plan 3 day waiver is available? Alternative insurance waiver    Will the waiver be used for post-acute placement? No    Lifestyle & Psychosocial Needs:  Social Determinants of Health     Food Insecurity: Not on file   Depression: Not on file   Housing Stability: Not on file   Tobacco Use: Not on file   Financial Resource Strain: Not on file   Alcohol Use: Not on file   Transportation Needs: Not on file   Physical Activity: Not on file   Interpersonal Safety: Not on file   Stress: Not on file   Social Connections: Not on file       Functional Status:  Prior to admission  patient needed assistance:   Dependent ADLs:: Ambulation-walker  Dependent IADLs:: Cleaning, Cooking, Laundry, Shopping, Meal Preparation, Transportation  Assesssment of Functional Status: Not at baseline with ADL Functioning    Mental Health Status:          Chemical Dependency Status:                Values/Beliefs:  Spiritual, Cultural Beliefs, Rastafarian Practices, Values that affect care:                 Additional Information:    MAY spoke with pt daughter Luzma over the phone due to COVID precautions. Luzma reports that pt lives at home with her and ambulates with a walker for short distances and a WC for longer distances. Luzma assists pt with cooking, cleaning, medications, and laundry. Catherine reports that she can transport pt back home at discharge. Luzma denies any questions or needs from  at this time. Noted PT is consulted, will follow for recommendations.     ALBERTO Dillard, LGSW  Inpatient Care Coordination  Emergency Room /Trinity Portillo, SW

## 2023-10-17 NOTE — PHARMACY-VANCOMYCIN DOSING SERVICE
Pharmacy Vancomycin Initial Note  Date of Service 2023  Patient's  [id number]  89 year old, male    Indication: Bacteremia    Current estimated CrCl = CrCl cannot be calculated (Unknown ideal weight.).    Creatinine for last 3 days  10/16/2023:  8:28 AM Creatinine 2.76 mg/dL    Recent Vancomycin Level(s) for last 3 days  No results found for requested labs within last 3 days.      Vancomycin IV Administrations (past 72 hours)        No vancomycin orders with administrations in past 72 hours.                    Nephrotoxins and other renal medications (From now, onward)      Start     Dose/Rate Route Frequency Ordered Stop    10/18/23 2200  vancomycin (VANCOCIN) 750 mg in sodium chloride 0.9 % 250 mL intermittent infusion         750 mg  over 90 Minutes Intravenous EVERY 48 HOURS 10/17/23 0126      10/17/23 0130  vancomycin (VANCOCIN) 1,000 mg in 200 mL dextrose intermittent infusion         1,000 mg  200 mL/hr over 1 Hours Intravenous ONCE 10/17/23 0125              Contrast Orders - past 72 hours (72h ago, onward)      None            [employer] Prediction of Planned Initial Vancomycin Regimen  Loading dose: 1000 mg at 01:30 10/17/2023.  Regimen: 750 mg IV every 48 hours.  Start time: 22:00 on 10/18/2023  Exposure target: AUC24 (range)400-600 mg/L.hr   AUC24,ss: 448 mg/L.hr  Probability of AUC24 > 400: 61 %  Ctrough,ss: 14.5 mg/L  Probability of Ctrough,ss > 20: 25 %  Probability of nephrotoxicity (Lodise CAROLE ): 10 %          Plan:  Start vancomycin 1000 mg IV loading dose, then 750 mg q48h.   Vancomycin monitoring method: AUC  Vancomycin therapeutic monitoring goal: 400-600 mg*h/L  Pharmacy will check vancomycin levels as appropriate in 1-3 Days.    Serum creatinine levels will be ordered daily for the first week of therapy and at least twice weekly for subsequent weeks.      Lisa Emanuel RP

## 2023-10-17 NOTE — PLAN OF CARE
Goal Outcome Evaluation:      Plan of Care Reviewed With: patient, family    Overall Patient Progress: no change    A&OX3 forgetful. Jamaican speaking. Legally blind. Daughter at bed side. VSS Oxygen 96% on room air. Redness to coccyx. Dyspnea on exertion, Up with assist of 1 with gait belt and  walker. UP to the recliner. Reg diet. Critical lab result is gram+cocci in pairs and chains and was isolated for streptococcus species. Md started patient on IV Vancomycin. Continue POC and monitoring.

## 2023-10-17 NOTE — PROGRESS NOTES
10/17/23 4764   Appointment Info   Signing Clinician's Name / Credentials (PT) Deann Herrera DPT       Present no   Language Per ED provider note, pt/family declining professional , dtr Luzma interpreted throughout session   Living Environment   People in Home child(terrence), adult   Current Living Arrangements house   Home Accessibility no concerns   Transportation Anticipated family or friend will provide   Living Environment Comments Pt's dtr Luzma providing all information; per dtr, pt lives with her in a home with a level entry and pt stays on main level; pt has 2 other dtrs that live extremely close; when Luzma went to work, one of the other dtrs would stop by to check on pt; Luzma plans on taking time off work to be a full time caregiver for pt to keep him safe; pt ambulates with 2WW, sometimes no AD, family assist with all ADLs/IADLs   Self-Care   Usual Activity Tolerance good   Current Activity Tolerance good   Equipment Currently Used at Home walker, rolling   Fall history within last six months no  (conflicting info as chart reports pt had 2 falls just prior to admission but pt's dtr reports no falls)   General Information   Onset of Illness/Injury or Date of Surgery 10/16/23   Referring Physician Lianet Bowser PA-C   Patient/Family Therapy Goals Statement (PT) to go home   Pertinent History of Current Problem (include personal factors and/or comorbidities that impact the POC) 89 year old male with PMHx significant for DM type II, HTN, HLP, CKD, BPH, and is legally blind, who was admitted on 10/16/2023 with generalized weakness and falls due to Covid-19 infection. He tested positive on 10/11/23   Existing Precautions/Restrictions fall   General Observations Reclining in bedside chair, eyes closed, per dtr can see some shadows and light changes but otherwise, unable to see anything else   Cognition   Affect/Mental Status (Cognition) unable/difficult  to assess   Orientation Status (Cognition) unable/difficult to assess   Follows Commands (Cognition)   (appears to follow dtr's directions well)   Pain Assessment   Patient Currently in Pain No   Integumentary/Edema   Integumentary/Edema Comments see RN assessment   Posture    Posture Forward head position;Protracted shoulders   Range of Motion (ROM)   Range of Motion ROM is WFL   Strength (Manual Muscle Testing)   Strength (Manual Muscle Testing) strength is WFL   Bed Mobility   Bed Mobility sit-supine   Sit-Supine Fajardo (Bed Mobility) minimum assist (75% patient effort)  (provided by dtr, reports this is baseline)   Transfers   Transfers sit-stand transfer   Sit-Stand Transfer   Sit-Stand Fajardo (Transfers) contact guard   Assistive Device (Sit-Stand Transfers) walker, front-wheeled   Comment, (Sit-Stand Transfer) guidance to move hands to walker handles d/t low vision   Gait/Stairs (Locomotion)   Fajardo Level (Gait)   (SBA)   Assistive Device (Gait) walker, front-wheeled   Distance in Feet (Gait) 2 laps in room ~60' total   Pattern (Gait) step-through   Comment, (Gait/Stairs) pt needs min A to manuever walker but this is d/t low vision   Balance   Balance Comments Fair, no overt LOB noted   Clinical Impression   Criteria for Skilled Therapeutic Intervention Evaluation only   PT Diagnosis (PT) at/close to baseline for mobility   Clinical Presentation (PT Evaluation Complexity) stable   Clinical Presentation Rationale clinical judgement   Clinical Decision Making (Complexity) low complexity   Risk & Benefits of therapy have been explained evaluation/treatment results reviewed;care plan/treatment goals reviewed;risks/benefits reviewed;current/potential barriers reviewed;participants voiced agreement with care plan;participants included;patient;daughter   PT Total Evaluation Time   PT Eval, Low Complexity Minutes (59202) 18   PT Discharge Planning   PT Plan no acute/post acute PT goals   PT  Discharge Recommendation (DC Rec) home with assist   PT Rationale for DC Rec Pt appears at/close to baseline for mobilty with 2WW.  Dtr present throughout session and reports pt appears at baseline and is comfortable taking pt home at the level of mobility she observed pt amb with PT.  No further acute/post-acute PT goals identified   PT Brief overview of current status SBA with 2WW but needs assist manuevering walker d/t low vision   PT Equipment Needed at Discharge   (has appropriate equipment)   Total Session Time   Total Session Time (sum of timed and untimed services) 18

## 2023-10-18 LAB
ANION GAP SERPL CALCULATED.3IONS-SCNC: 11 MMOL/L (ref 7–15)
BUN SERPL-MCNC: 42.7 MG/DL (ref 8–23)
CALCIUM SERPL-MCNC: 8.6 MG/DL (ref 8.2–9.6)
CHLORIDE SERPL-SCNC: 108 MMOL/L (ref 98–107)
CREAT SERPL-MCNC: 2.78 MG/DL (ref 0.67–1.17)
DEPRECATED HCO3 PLAS-SCNC: 20 MMOL/L (ref 22–29)
EGFRCR SERPLBLD CKD-EPI 2021: 21 ML/MIN/1.73M2
GLUCOSE BLDC GLUCOMTR-MCNC: 124 MG/DL (ref 70–99)
GLUCOSE BLDC GLUCOMTR-MCNC: 208 MG/DL (ref 70–99)
GLUCOSE BLDC GLUCOMTR-MCNC: 223 MG/DL (ref 70–99)
GLUCOSE BLDC GLUCOMTR-MCNC: 238 MG/DL (ref 70–99)
GLUCOSE SERPL-MCNC: 129 MG/DL (ref 70–99)
POTASSIUM SERPL-SCNC: 5 MMOL/L (ref 3.4–5.3)
SODIUM SERPL-SCNC: 139 MMOL/L (ref 135–145)

## 2023-10-18 PROCEDURE — 36415 COLL VENOUS BLD VENIPUNCTURE: CPT | Performed by: INTERNAL MEDICINE

## 2023-10-18 PROCEDURE — 120N000001 HC R&B MED SURG/OB

## 2023-10-18 PROCEDURE — 80048 BASIC METABOLIC PNL TOTAL CA: CPT | Performed by: INTERNAL MEDICINE

## 2023-10-18 PROCEDURE — 99222 1ST HOSP IP/OBS MODERATE 55: CPT | Performed by: INTERNAL MEDICINE

## 2023-10-18 PROCEDURE — 250N000012 HC RX MED GY IP 250 OP 636 PS 637: Performed by: INTERNAL MEDICINE

## 2023-10-18 PROCEDURE — 99232 SBSQ HOSP IP/OBS MODERATE 35: CPT | Performed by: INTERNAL MEDICINE

## 2023-10-18 PROCEDURE — 250N000013 HC RX MED GY IP 250 OP 250 PS 637: Performed by: PHYSICIAN ASSISTANT

## 2023-10-18 PROCEDURE — 250N000012 HC RX MED GY IP 250 OP 636 PS 637: Performed by: PHYSICIAN ASSISTANT

## 2023-10-18 PROCEDURE — 250N000011 HC RX IP 250 OP 636: Mod: JZ | Performed by: INTERNAL MEDICINE

## 2023-10-18 PROCEDURE — 250N000011 HC RX IP 250 OP 636: Performed by: PHYSICIAN ASSISTANT

## 2023-10-18 RX ADMIN — HEPARIN SODIUM 5000 UNITS: 5000 INJECTION, SOLUTION INTRAVENOUS; SUBCUTANEOUS at 02:44

## 2023-10-18 RX ADMIN — INSULIN ASPART 2 UNITS: 100 INJECTION, SOLUTION INTRAVENOUS; SUBCUTANEOUS at 12:48

## 2023-10-18 RX ADMIN — INSULIN HUMAN 5 UNITS: 100 INJECTION, SUSPENSION SUBCUTANEOUS at 12:49

## 2023-10-18 RX ADMIN — CEFTRIAXONE 2 G: 2 INJECTION, POWDER, FOR SOLUTION INTRAMUSCULAR; INTRAVENOUS at 14:17

## 2023-10-18 RX ADMIN — GUAIFENESIN 600 MG: 600 TABLET, EXTENDED RELEASE ORAL at 09:11

## 2023-10-18 RX ADMIN — INSULIN HUMAN 5 UNITS: 100 INJECTION, SUSPENSION SUBCUTANEOUS at 21:18

## 2023-10-18 RX ADMIN — DEXAMETHASONE 6 MG: 2 TABLET ORAL at 09:10

## 2023-10-18 RX ADMIN — INSULIN ASPART 2 UNITS: 100 INJECTION, SOLUTION INTRAVENOUS; SUBCUTANEOUS at 16:51

## 2023-10-18 RX ADMIN — PANTOPRAZOLE SODIUM 40 MG: 40 TABLET, DELAYED RELEASE ORAL at 09:11

## 2023-10-18 RX ADMIN — GLIPIZIDE 5 MG: 5 TABLET, FILM COATED, EXTENDED RELEASE ORAL at 09:10

## 2023-10-18 RX ADMIN — AMLODIPINE BESYLATE 10 MG: 10 TABLET ORAL at 09:10

## 2023-10-18 RX ADMIN — TAMSULOSIN HYDROCHLORIDE 0.4 MG: 0.4 CAPSULE ORAL at 09:10

## 2023-10-18 RX ADMIN — HEPARIN SODIUM 5000 UNITS: 5000 INJECTION, SOLUTION INTRAVENOUS; SUBCUTANEOUS at 14:17

## 2023-10-18 RX ADMIN — GUAIFENESIN 600 MG: 600 TABLET, EXTENDED RELEASE ORAL at 21:18

## 2023-10-18 ASSESSMENT — ACTIVITIES OF DAILY LIVING (ADL)
ADLS_ACUITY_SCORE: 41

## 2023-10-18 NOTE — PLAN OF CARE
Goal Outcome Evaluation:      Plan of Care Reviewed With: patient    Overall Patient Progress: no changeOverall Patient Progress: no change    Temp: 97.6  F (36.4  C) Temp src: Oral BP: 115/42 Pulse: 79   Resp: 18 SpO2: 96 % O2 Device: None (Room air)       Salvadorean speaking-daughters in room. A&Ox4, denies pain.LPIV saline locked. A1 gb/walker. Pt uses bathroom and calls appropriately. ACHS with sliding scale-insulin given per protocol. Regular diet with good appetite. Mepilex on sacrum-slightly reddened but more just for extra padding. Covid precautions maintained. Explained to pt and daughter that at 0230 pt will be given a heparin shot to which daughter protested waking him up. Maybe future night dose can be rescheduled to an earlier time to not wake pt up. Will tell night shift to pass on to dayshift to see if MD can change order. Had positive blood culture for strep...recieving IV antibiotics. Discharge TBD.

## 2023-10-18 NOTE — CONSULTS
[facility]    Infectious Disease Consultation     Date of Admission:  10/16/2023  Date of Consult (When I saw the patient): 10/18/23    Assessment & Plan   Jessica Linda is a 89 year old who was admitted on 10/16/2023.     Impression: 1 89-year-old [place]ese immigrant male, acute illness including some degree of cough, fever, falls, found to have seemingly unifying diagnosis of COVID-19 positive, relatively mild disease  2 surprise positive blood culture for Streptococcus bovis, no major GI or abdominal symptoms, no obvious primary site, not a likely pneumonia organism and not a logical connection to COVID-19, also not a usual contaminant so of some significance  3 diabetes mellitus  4 chronic kidney disease    REC 1 minimal COVID-19 treatment for now as were doing, on steroids, strep bovis difficult to ignore generally treated with at least some amount of antibiotics, await full information see if other cultures turn positive, if not possibly 10 to 14-day IV course with follow-up cultures possibly as soon as tomorrow start investigating options for this  2 in addition to follow-up blood cultures after stopping the antibiotics, continue to watch for any secondary sites of pain or discomfort, recurrent fever, and consideration of eventual colonoscopy or similar although in this 89-year-old male that association probably could be watched without major work-up        Claudio Vega MD    Reason for Consult   Reason for consult: I was asked to evaluate this patient for bacteremia.    Primary Care Physician   SCOTT NOBLE    Chief Complaint   Fever and fall    History is obtained from the patient and medical records    History of Present Illness   Jessica Linda is a 89 year old male who presents with 1 week illness, found to be COVID-19 positive may be some mild respiratory symptoms but not that prominent had some further fever symptoms slight confusion and falls.  At admission found to have 1 of 2  blood cultures growing Streptococcus bovis.  He is notably have no particular new pain site nor does he have any abdominal or GI symptoms at all.  He already feels better has been getting ceftriaxone    Past Medical History   I have reviewed this patient's medical history and updated it with pertinent information if needed.   Past Medical History:   Diagnosis Date    Blindness of left eye     Constipation     Diabetes mellitus (H) 2008    Hyperlipidaemia     Hypertension     Knee pain, right        Past Surgical History   I have reviewed this patient's surgical history and updated it with pertinent information if needed.  Past Surgical History:   Procedure Laterality Date    NO HISTORY OF SURGERY         Prior to Admission Medications   Prior to Admission Medications   Prescriptions Last Dose Informant Patient Reported? Taking?   LANsoprazole (PREVACID) 30 MG DR capsule 10/15/2023  Yes Yes   Sig: Take 30 mg by mouth daily   acetaminophen (TYLENOL) 500 MG tablet 10/15/2023  Yes Yes   Sig: Take 500-1,000 mg by mouth every 4 hours as needed for mild pain   amLODIPine (NORVASC) 10 MG tablet 10/15/2023  Yes Yes   Sig: Take 10 mg by mouth daily   glipiZIDE (GLUCOTROL XL) 2.5 MG 24 hr tablet 10/15/2023 at am  Yes Yes   Sig: Take 5 mg by mouth daily   guaiFENesin-codeine (ROBITUSSIN AC) 100-10 MG/5ML solution prn  Yes Yes   Sig: Take 5 mLs by mouth nightly as needed for cough   losartan (COZAAR) 50 MG tablet 10/15/2023  Yes Yes   Sig: Take 50 mg by mouth daily   polyethylene glycol (MIRALAX) 17 g packet prn  Yes Yes   Sig: Take 1 packet by mouth daily as needed for constipation   simvastatin (ZOCOR) 20 MG tablet 10/15/2023 at pm  Yes Yes   Sig: Take 20 mg by mouth at bedtime   tamsulosin (FLOMAX) 0.4 MG capsule 10/15/2023  Yes Yes   Sig: Take 0.4 mg by mouth daily      Facility-Administered Medications: None     Allergies   Allergies   Allergen Reactions    Ibuprofen Other (See Comments)       Immunization History  "  Immunization History   Administered Date(s) Administered    COVID-19 MONOVALENT 12+ (Pfizer) 04/02/2021, 04/23/2021, 11/23/2021    COVID-19 Monovalent 12+ (Pfizer 2022) 05/10/2022    Influenza (IIV3) PF 09/01/2012       Social History   I have reviewed this patient's social history and updated it with pertinent information if needed. Jessica Linda  reports that he has never smoked. He has never used smokeless tobacco. He reports that he does not drink alcohol and does not use drugs.    Family History   I have reviewed this patient's family history and updated it with pertinent information if needed.   Family History   Problem Relation Age of Onset    Hypertension Father        Review of Systems   The 10 point Review of Systems is negative including basically feeling well now may be minimal cough may be slight weakness, no significant abdominal or GI symptoms    Physical Exam   Temp: 97.4  F (36.3  C) Temp src: Oral BP: 121/78 Pulse: 74   Resp: 16 SpO2: 97 % O2 Device: None (Room air)    Vital Signs with Ranges  Temp:  [97.3  F (36.3  C)-98.2  F (36.8  C)] 97.4  F (36.3  C)  Pulse:  [74-91] 74  Resp:  [16-18] 16  BP: (115-144)/(42-86) 121/78  SpO2:  [96 %-97 %] 97 %  132 lbs 4.42 oz  Body mass index is 24.99 kg/m .    GENERAL APPEARANCE:  awake alert, Czech speaking daughters are here to interpret  EYES: Eyes grossly normal to inspection  NECK: no adenopathy  RESP: lungs clear   CV: regular rates and rhythm  LYMPHATICS: normal ant/post cervical and supraclavicular nodes  ABDOMEN: soft, nontender  MS: extremities normal  SKIN: no suspicious lesions or rashes        Data   All laboratory and imaging data in the past 24 hours reviewed  No results for input(s): \"CULT\" in the last 168 hours.  No lab results found.    Invalid input(s): \"UC\"       All cultures:  Recent Labs   Lab 10/17/23  1145 10/16/23  0929 10/16/23  0828   CULTURE No growth after 12 hours  No growth after 12 hours Positive on the 1st day of " incubation*  Streptococcus gallolyticus (Streptococcus bovis group)* No growth after 2 days      Blood culture:  Results for orders placed or performed during the hospital encounter of 10/16/23   Blood Culture Hand, Left    Specimen: Hand, Left; Blood   Result Value Ref Range    Culture No growth after 12 hours    Blood Culture Hand, Right    Specimen: Hand, Right; Blood   Result Value Ref Range    Culture No growth after 12 hours    Blood Culture Hand, Right    Specimen: Hand, Right; Blood   Result Value Ref Range    Culture Positive on the 1st day of incubation (A)     Culture (AA)      Streptococcus gallolyticus (Streptococcus bovis group)   Blood Culture Peripheral Blood    Specimen: Peripheral Blood   Result Value Ref Range    Culture No growth after 2 days       Urine culture:  No results found for this or any previous visit.

## 2023-10-18 NOTE — PLAN OF CARE
Goal Outcome Evaluation:      Plan of Care Reviewed With: patient, family    Overall Patient Progress: improvingOverall Patient Progress: improving  Family at bedside. Very attentive. ID following. On Rocephin for +BC.  DM on coverage insulin. PIV SL. Oral steroids. Legally blind. Interpretor waiver signed by daughter today. Verified via interp services.  Tele SR. Continue POC.

## 2023-10-18 NOTE — PROGRESS NOTES
[facility]  Hospitalist Progress Note  Abhay Sawyer MD 10/18/2023    Reason for Stay (Diagnosis): COVID, blood cx positive for strep         Assessment and Plan:      Summary of Stay: Jessica Linda is a  89 year old male with PMHx significant for DM type II, HTN, HLP, CKD, BPH, and is legally blind, who was admitted on 10/16/2023 with generalized weakness and falls due to Covid-19 infection. He tested positive on 10/11/23     Course has been complicated by blood cx (1/2) positive for streptococcal species     Plan:  - continue Rocephin - for now we are assuming that strep is pathogenic and not contaminant  - ID consult to provide recommendations on antibiotic for strep treatment  - repeat blood cx to document clearance reviewed and remain negative  - add NPH insulin to cover for AM hyperglycemia due to dxamethasone     1. Acute COVID infection  - Developed cough, sore throat, rhinorrhea, fever and SOB ~8-9 days ago, was seen in clinic on 10/11, influenza was negative, Covid returned positive but clinic was unable to contact family so they were not aware of results, therefore was not started on Paxlovid.   - Not hypoxic   - CXR clear  - admit to OBS  - on dexamethasone for RAD exacerbation/wheeze - likely stop on discharge  - supportive cares  - IS  - PT/SW consults  - Mucinex BID     2.  Streptococcal bacteremia  - 1/2 blood cx positive  - await speciation and sensitivities  - treat with IV Rocephin  - no localizing sx beyond recent resp sx attributed to COVID  - CXR neg  - no e/o cellulitis or pharyngitis on exam  - repeat blood cx to document clearance (NGTD)  - ID consult to provide recommendations re: treatment     2. DM type II  - Managed at home with glipizide, resumed.   - Anticipate hyperglycemia given Decadron use  - cover with sliding scale insulin and NPH insulin  - hypoglycemia protocol      3. HTN  - hold losartan due to mild hyperkalemia in setting of CKD  - continue amlodipine  "with parameters.     4. BPH  Resume home Flomax     5. HLP        6. CKD  - baseline Cr 2.4-2.5         7.  Social  - Lives at home, is legally blind but uses a string to get around at home.         Diet:  regular diet  DVT Prophylaxis: Heparin SQ  Silver Catheter: Not present  Lines: None     Cardiac Monitoring: None  Code Status:  full code        Interval History (Subjective):      Dtr providing communication at bedside.  No complaints today.  Daughter is happy with how the patient looks and feels he is much better.  Patient feels well                  Physical Exam:      Last Vital Signs:  /70 (BP Location: Right arm)   Pulse 74   Temp 97.4  F (36.3  C) (Oral)   Resp 16   Ht 1.549 m (5' 1\")   Wt 60 kg (132 lb 4.4 oz)   SpO2 97%   BMI 24.99 kg/m        Intake/Output Summary (Last 24 hours) at 10/18/2023 1053  Last data filed at 10/18/2023 0630  Gross per 24 hour   Intake --   Output 525 ml   Net -525 ml       Constitutional: Awake, alert, cooperative, no apparent distress     Respiratory: Clear to auscultation bilaterally, no crackles or wheezing   Cardiovascular: Regular rate and rhythm, normal S1 and S2, and no murmur noted   Abdomen: Normal bowel sounds, soft, non-distended, non-tender   Skin: No rashes, no cyanosis, dry to touch   Neuro: Alert and oriented x3, no weakness, numbness, memory loss   Extremities: No edema, normal range of motion   Other(s):        All other systems: Negative          Medications:      All current medications were reviewed with changes reflected in problem list.         Data:      All new lab and imaging data was reviewed.   Labs:  Recent Labs   Lab 10/17/23  0655   WBC 5.9   HGB 11.2*   HCT 34.6*   MCV 92         Imaging:   No results found for this or any previous visit (from the past 24 hour(s)).   "

## 2023-10-18 NOTE — PLAN OF CARE
"Nursing Summary:    [id number]    Pt is alert and oriented; Syriac speaking. Pt denies any pain or shortness of breath and on room air. Pt is 1 assist GB+W. Daughter at bedside. Special precautions maintained. Ongoing monitoring.  Plan: Discharge plan pending.    /84 (BP Location: Right arm)   Pulse 80   Temp 97.3  F (36.3  C) (Oral)   Resp 16   Ht 1.549 m (5' 1\")   Wt 60 kg (132 lb 4.4 oz)   SpO2 97%   BMI 24.99 kg/m                           "

## 2023-10-19 ENCOUNTER — HOME INFUSION (PRE-WILLOW HOME INFUSION) (OUTPATIENT)
Dept: PHARMACY | Facility: CLINIC | Age: 88
End: 2023-10-19
Payer: COMMERCIAL

## 2023-10-19 VITALS
DIASTOLIC BLOOD PRESSURE: 76 MMHG | BODY MASS INDEX: 24.97 KG/M2 | HEIGHT: 61 IN | SYSTOLIC BLOOD PRESSURE: 121 MMHG | OXYGEN SATURATION: 96 % | WEIGHT: 132.28 LBS | HEART RATE: 83 BPM | RESPIRATION RATE: 16 BRPM | TEMPERATURE: 97.8 F

## 2023-10-19 LAB
BACTERIA BLD CULT: ABNORMAL
BACTERIA BLD CULT: ABNORMAL
CREAT SERPL-MCNC: 2.75 MG/DL (ref 0.67–1.17)
EGFRCR SERPLBLD CKD-EPI 2021: 21 ML/MIN/1.73M2
GLUCOSE BLDC GLUCOMTR-MCNC: 100 MG/DL (ref 70–99)
GLUCOSE BLDC GLUCOMTR-MCNC: 78 MG/DL (ref 70–99)
GLUCOSE BLDC GLUCOMTR-MCNC: 95 MG/DL (ref 70–99)
PLATELET # BLD AUTO: 266 10E3/UL (ref 150–450)

## 2023-10-19 PROCEDURE — 250N000011 HC RX IP 250 OP 636: Mod: JZ | Performed by: INTERNAL MEDICINE

## 2023-10-19 PROCEDURE — 99232 SBSQ HOSP IP/OBS MODERATE 35: CPT | Performed by: INTERNAL MEDICINE

## 2023-10-19 PROCEDURE — 85049 AUTOMATED PLATELET COUNT: CPT | Performed by: PHYSICIAN ASSISTANT

## 2023-10-19 PROCEDURE — 36415 COLL VENOUS BLD VENIPUNCTURE: CPT | Performed by: HOSPITALIST

## 2023-10-19 PROCEDURE — 36569 INSJ PICC 5 YR+ W/O IMAGING: CPT

## 2023-10-19 PROCEDURE — 99238 HOSP IP/OBS DSCHRG MGMT 30/<: CPT | Performed by: INTERNAL MEDICINE

## 2023-10-19 PROCEDURE — 250N000013 HC RX MED GY IP 250 OP 250 PS 637: Performed by: PHYSICIAN ASSISTANT

## 2023-10-19 PROCEDURE — 272N000748 HC KIT, CATH 3FR OR 4FR SINGLE LUMEN POWERMIDLINE

## 2023-10-19 PROCEDURE — 250N000011 HC RX IP 250 OP 636: Performed by: PHYSICIAN ASSISTANT

## 2023-10-19 PROCEDURE — 82565 ASSAY OF CREATININE: CPT | Performed by: HOSPITALIST

## 2023-10-19 RX ORDER — LIDOCAINE 40 MG/G
CREAM TOPICAL
Status: DISCONTINUED | OUTPATIENT
Start: 2023-10-19 | End: 2023-10-19 | Stop reason: HOSPADM

## 2023-10-19 RX ORDER — CEFTRIAXONE 1 G/1
2000 INJECTION, POWDER, FOR SOLUTION INTRAMUSCULAR; INTRAVENOUS DAILY
Qty: 1 EACH | Refills: 1 | Status: SHIPPED | OUTPATIENT
Start: 2023-10-19 | End: 2023-10-29

## 2023-10-19 RX ADMIN — GLIPIZIDE 5 MG: 5 TABLET, FILM COATED, EXTENDED RELEASE ORAL at 10:08

## 2023-10-19 RX ADMIN — HEPARIN SODIUM 5000 UNITS: 5000 INJECTION, SOLUTION INTRAVENOUS; SUBCUTANEOUS at 02:39

## 2023-10-19 RX ADMIN — GUAIFENESIN 600 MG: 600 TABLET, EXTENDED RELEASE ORAL at 10:08

## 2023-10-19 RX ADMIN — TAMSULOSIN HYDROCHLORIDE 0.4 MG: 0.4 CAPSULE ORAL at 10:08

## 2023-10-19 RX ADMIN — PANTOPRAZOLE SODIUM 40 MG: 40 TABLET, DELAYED RELEASE ORAL at 10:08

## 2023-10-19 RX ADMIN — AMLODIPINE BESYLATE 10 MG: 10 TABLET ORAL at 10:08

## 2023-10-19 RX ADMIN — CEFTRIAXONE 2 G: 2 INJECTION, POWDER, FOR SOLUTION INTRAMUSCULAR; INTRAVENOUS at 15:20

## 2023-10-19 ASSESSMENT — ACTIVITIES OF DAILY LIVING (ADL)
ADLS_ACUITY_SCORE: 41
ADLS_ACUITY_SCORE: 41
ADLS_ACUITY_SCORE: 39
ADLS_ACUITY_SCORE: 39
ADLS_ACUITY_SCORE: 41
ADLS_ACUITY_SCORE: 39
ADLS_ACUITY_SCORE: 39

## 2023-10-19 NOTE — PROGRESS NOTES
Home Infusion  Jessica will be discharging today and going home on IV ceftriaxone.  I met with pt's daughter, Luzma at bedside and instructed in IV administration via SL midline with SAS flushing. Had Luzma perform hands on with practice equipment and teaching sheets. Provided information about supplies and supply delivery, storage of medication, checking of label, dosing times, plan for SNV and 24/7 availability of [facility] staff. [facility] will follow for home RN.   Luzma verbalized understanding of information given. She demonstrated very good technique with practice and verbalized good understanding of process.  Luzma is requesting a home RN visit tomorrow for continued teaching for her family and first home dose, which [facility] will coordinate.   Dosing schedule: Pt will dose at 1700h in the home setting.   Delivery: Medication and Supplies will be delivered to the pt's home by end of day.   Jessica is ready for discharge from [facility] perspective.    Angelita Figueroa RN  [facility] Liaison  [phone] (M-F 8a-5p)  [phone] Office

## 2023-10-19 NOTE — PROGRESS NOTES
A&OX4. LS clear. VSS Oxygen 96% on room air. Denied pain. Legally blind.   Right brachial medline intact. Patient to discharge home with Midline for home infusion abx continuation. Daughter at bedside. Patient is adequate for discharge. All discharge and medication information provided to patient and daughter. Patient's daughter acknowledged understanding all the instructions provided. Patient is wheeled to the front lobby via wheelchair at 1705. Driven home by daughter via private transportation.

## 2023-10-19 NOTE — DISCHARGE SUMMARY
[facility]  Hospitalist Discharge Summary      Date of Admission:  10/16/2023  Date of Discharge:  10/19/2023  Discharging Provider: Abhay Sawyer MD  Discharge Service: Hospitalist Service    Discharge Diagnoses   COVID infection    Streptococcus Bovis bacteremia  -Treated and discharged on IV Rocephin (additional 10 day course recommended by ID on discharge)    PMH:    DM type 2  HTN  BPH  Hyperlipidemia  CKD with baseline creatinine near 2.5    Clinically Significant Risk Factors          Follow-ups Needed After Discharge   Follow-up Appointments     Follow-up and recommended labs and tests       Follow-up with primary MD in 5 to 7 days after discharge  Remove midline/PICC line after IV antibiotic course is completed  Recommend to repeat blood cultures to be completed 1 week after antibiotic   therapy is completed to make sure that the infection has resolved.  Please   discussed this recommendation with your primary care provider who can   arrange this for you.  Losartan was discontinued this admission due to high potassium levels   which is a known side effect from this medication.  Could consider colonoscopy given the presence of Streptococcus bovis   infection.  There is an increased risk for colon cancer when this bacteria   is present in the bloodstream.  Discuss the risks/benefits of a   colonoscopy further with your primary care provider at next clinic visit.              Unresulted Labs Ordered in the Past 30 Days of this Admission       Date and Time Order Name Status Description    10/17/2023 11:10 AM Blood Culture Hand, Left Preliminary     10/17/2023 11:10 AM Blood Culture Hand, Right Preliminary     10/16/2023  8:26 AM Blood Culture Peripheral Blood Preliminary             Discharge Disposition   Discharged to home  Condition at discharge: Stable    Hospital Course   89 year old male with PMHx significant for DM type II, HTN, HLP, CKD, BPH, and is legally blind, who was  admitted on 10/16/2023 with generalized weakness and falls due to Covid-19 infection. He tested positive on 10/11/23    In regards to his acute COVID infection, patient did have some wheezing on presentation that resolved while hospitalized.  No fevers.  He was not hypoxic.  No infiltrate on chest x-ray.  He was treated with steroids while hospitalized, but given improvement in respiratory status and resolution of wheezing steroids are being discontinued on discharge.     Course was complicated by blood cx (1/2) positive for streptococcal bovis.  He was treated with IV Rocephin.  Follow-up blood cultures showed resolution of bacteremia.  Midline was placed for IV access and he is being discharged on 10 more days of IV Rocephin.  It was discussed with the patient and family that Streptococcus bovis infections can occur in the setting of colon cancer, and typically a colonoscopy is recommended to rule out colon malignancy.  Patient and family are aware of this recommendation, and taking into account his age and comorbidities, they will discuss the risks/benefits of this recommendation further at his next clinic visit with his primary provider.    Infectious disease recommends repeating blood cultures times two 1 week after the patient completes IV antibiotic therapy to ensure resolution of bacteremia.  Patient and family instructed to follow-up with primary care provider to have follow up blood cultures obtained    Consultations This Hospital Stay   PHYSICAL THERAPY ADULT IP CONSULT  CARE MANAGEMENT / SOCIAL WORK IP CONSULT  PHARMACY TO DOSE VANCO  INFECTIOUS DISEASES IP CONSULT  CARE MANAGEMENT / SOCIAL WORK IP CONSULT  VASCULAR ACCESS ADULT IP CONSULT  VASCULAR ACCESS ADULT IP CONSULT    Code Status   Full Code    Time Spent on this Encounter   I, Abhay Sawyer MD, personally saw the patient today and spent less than or equal to 30 minutes discharging this patient.       Abhay Sawyer MD  [place]  [place] [address] [facility]  [address] [place] [address]  Phone: [phone]  Fax: [phone]  ______________________________________________________________________    Physical Exam   Vital Signs: Temp: 97.6  F (36.4  C) Temp src: Oral BP: 138/85 Pulse: 89   Resp: 16 SpO2: 96 % O2 Device: None (Room air)    Weight: 132 lbs 4.42 oz  Awake, alert, oriented.  Daughter at bedside assisting with communication  Regular rate and rhythm  Clear to auscultation bilaterally  Abdomen nontender nondistended.  Soft    Primary Care Physician   SCOTT NOBLE    Discharge Orders      Home Infusion Referral      Reason for your hospital stay    COVID infection  Streptococcus bovis bacteremia     Follow-up and recommended labs and tests     Follow-up with primary MD in 5 to 7 days after discharge  Remove midline/PICC line after IV antibiotic course is completed  Recommend to repeat blood cultures to be completed 1 week after antibiotic therapy is completed to make sure that the infection has resolved.  Please discussed this recommendation with your primary care provider who can arrange this for you.  Losartan was discontinued this admission due to high potassium levels which is a known side effect from this medication.  Could consider colonoscopy given the presence of Streptococcus bovis infection.  There is an increased risk for colon cancer when this bacteria is present in the bloodstream.  Discuss the risks/benefits of a colonoscopy further with your primary care provider at next clinic visit.     Activity    Your activity upon discharge: activity as tolerated     Diet    Follow this diet upon discharge: regular diet       Significant Results and Procedures   Results for orders placed or performed during the hospital encounter of 10/16/23   XR Chest 2 Views    Narrative    XR CHEST TWO VIEWS   10/16/2023 9:15 AM     HISTORY: Shortness of breath    COMPARISON: 3/19/2009.      Impression    IMPRESSION:  No acute cardiopulmonary disease.    REMBERTO SMITH MD         SYSTEM ID:  [id number]   CT Head w/o Contrast    Narrative    CT OF THE HEAD WITHOUT CONTRAST 10/16/2023 9:04 AM     COMPARISON: None    HISTORY: Fall. Trauma. Pain.    TECHNIQUE: 5 mm thick axial CT images of the head were acquired  without IV contrast material.    FINDINGS: There is moderate diffuse cerebral volume loss. There are  subtle patchy areas of decreased density in the cerebral white matter  bilaterally that are consistent with sequela of chronic small vessel  ischemic disease.    The ventricles and basal cisterns are within normal limits in  configuration given the degree of cerebral volume loss.  There is no  midline shift. There are no extra-axial fluid collections.    No intracranial hemorrhage, mass or recent infarct.    The visualized paranasal sinuses are well-aerated. There is no  mastoiditis. There are no fractures of the visualized bones.      Impression    IMPRESSION: Diffuse cerebral volume loss and cerebral white matter  changes consistent with chronic small vessel ischemic disease. No  evidence for acute intracranial pathology.      Radiation dose for this scan was reduced using automated exposure  control, adjustment of the mA and/or kV according to patient size, or  iterative reconstruction technique    OLIVIER CARLIN MD         SYSTEM ID:  [id number]       Discharge Medications   Current Discharge Medication List        START taking these medications    Details   cefTRIAXone (ROCEPHIN) 1 GM vial Inject 2 g (2,000 mg) into the vein daily for 10 days CBC with differential, creatinine, SGOT weekly while on this medication to be faxed to Dr. Vega office.  Qty: 1 each, Refills: 1    Associated Diagnoses: Gram-positive bacteremia           CONTINUE these medications which have NOT CHANGED    Details   acetaminophen (TYLENOL) 500 MG tablet Take 500-1,000 mg by mouth every 4 hours as needed for mild pain      amLODIPine (NORVASC) 10 MG  tablet Take 10 mg by mouth daily      glipiZIDE (GLUCOTROL XL) 2.5 MG 24 hr tablet Take 5 mg by mouth daily      guaiFENesin-codeine (ROBITUSSIN AC) 100-10 MG/5ML solution Take 5 mLs by mouth nightly as needed for cough      LANsoprazole (PREVACID) 30 MG DR capsule Take 30 mg by mouth daily      polyethylene glycol (MIRALAX) 17 g packet Take 1 packet by mouth daily as needed for constipation      simvastatin (ZOCOR) 20 MG tablet Take 20 mg by mouth at bedtime      tamsulosin (FLOMAX) 0.4 MG capsule Take 0.4 mg by mouth daily           STOP taking these medications       losartan (COZAAR) 50 MG tablet Comments:   Reason for Stopping:             Allergies   Allergies   Allergen Reactions    Ibuprofen Other (See Comments)

## 2023-10-19 NOTE — CARE PLAN
"Shift from [id number]     Inpatient Progress Note:  For complete assessment see flow sheet documentation.      Orientation: A/O, Persian speaking, Family at bedside to help translate.   Pain status: Denies pain during time of care   Activity: Ax 1 W &GB   LDA: PIV SL   Diet: Reg   Consults: PT, SW, ID   Discharge Plan: TBD     /75 (BP Location: Right arm)   Pulse 72   Temp 97.7  F (36.5  C) (Oral)   Resp 18   Ht 1.549 m (5' 1\")   Wt 60 kg (132 lb 4.4 oz)   SpO2 97%   BMI 24.99 kg/m      "

## 2023-10-19 NOTE — PROGRESS NOTES
I saw and examined this patient today.    I spoke to the patient's daughter at bedside.  I also spoke to another daughter, a nurse, over the phone while in the room.    I spoke with Dr. Almendarez's of infection disease.    Plans today:  -Midline placement  -Patient can discharge after arrangements for home infusion are made  -Infectious disease recommends repeat blood cultures times two 1 week after antibiotic therapy is completed  -I discussed with patient and family typical recommendation for colonoscopy in this situation given increased risk of colon cancer when the Streptococcus bovis is isolated.  Given age and comorbidities, risk/benefit ratio will need to be assessed prior to this decision being made.  I encouraged patient and family to follow-up with the patient's primary care provider to further discuss these options.  Patient does have an appoint with her primary care provider later this month.

## 2023-10-19 NOTE — PROGRESS NOTES
[facility]  Infectious Disease Progress Note          Assessment and Plan:   Date of Admission:  10/16/2023  Date of Consult (When I saw the patient): 10/18/23        Assessment & Plan  Jessica Linda is a 89 year old who was admitted on 10/16/2023.      Impression: 1 89-year-old [place] immigrant male, acute illness including some degree of cough, fever, falls, found to have seemingly unifying diagnosis of COVID-19 positive, relatively mild disease  2 surprise positive blood culture for Streptococcus bovis, no major GI or abdominal symptoms, no obvious primary site, not a likely pneumonia organism and not a logical connection to COVID-19, also not a usual contaminant so of some significance  3 diabetes mellitus  4 chronic kidney disease     REC 1 minimal COVID-19 treatment for now as we are doing, on steroids, strep bovis difficult to ignore generally treated with at least some amount of antibiotics, generally 2 full weeks IV ceftriaxone, no other further positive cultures and doing well  2 in addition to follow-up blood cultures after stopping the antibiotics, continue to watch for any secondary sites of pain or discomfort, recurrent fever, and consideration of eventual colonoscopy or similar although in this 89-year-old male that association probably could be watched without major work-up  3 I think okay midline, 10 more days IV ceftriaxone and at completion of therapy 1 week later blood cultures x2, orders in for midline and antibiotics probably okay disposition if we find a way to complete the IV antibiotics              Interval History:     no new complaints and doing well; no cp, sob, n/v/d, or abd pain.  Feels well, at baseline per family, temp is down, no further positive blood cultures no  secondary pain sites anywhere              Medications:      amLODIPine  10 mg Oral Daily    cefTRIAXone  2 g Intravenous Q24H    glipiZIDE  5 mg Oral Daily    guaiFENesin  600 mg Oral BID     "heparin ANTICOAGULANT  5,000 Units Subcutaneous Q12H    insulin aspart  1-7 Units Subcutaneous TID AC    insulin aspart  1-5 Units Subcutaneous At Bedtime    [Held by provider] losartan  50 mg Oral Daily    pantoprazole  40 mg Oral Daily    sodium chloride (PF)  10 mL Intracatheter Q8H    sodium chloride (PF)  3 mL Intracatheter Q8H    tamsulosin  0.4 mg Oral Daily                  Physical Exam:   Blood pressure 123/76, pulse 89, temperature 97.6  F (36.4  C), temperature source Oral, resp. rate 16, height 1.549 m (5' 1\"), weight 60 kg (132 lb 4.4 oz), SpO2 96%.  Wt Readings from Last 2 Encounters:   10/16/23 60 kg (132 lb 4.4 oz)   11/27/12 57.2 kg (126 lb)     Vital Signs with Ranges  Temp:  [97.3  F (36.3  C)-97.7  F (36.5  C)] 97.6  F (36.4  C)  Pulse:  [72-89] 89  Resp:  [16-18] 16  BP: (116-139)/(73-81) 123/76  SpO2:  [96 %-98 %] 96 %    Constitutional: Awake, alert, cooperative, no apparent distress quite well per family at baseline     Lungs: Clear to auscultation bilaterally, no crackles or wheezing   Cardiovascular: Regular rate and rhythm, normal S1 and S2, and no murmur noted   Abdomen: Normal bowel sounds, soft, non-distended, non-tender   Skin: No rashes, no cyanosis, no edema   Other:           Data:   All microbiology laboratory data reviewed.  Recent Labs   Lab Test 10/19/23  0646 10/17/23  0655 10/16/23  0828   WBC  --  5.9 9.6   HGB  --  11.2* 12.2*   HCT  --  34.6* 37.0*   MCV  --  92 91    159 161     Recent Labs   Lab Test 10/19/23  0646 10/18/23  0659 10/17/23  0655   CR 2.75* 2.78* 2.70*     No lab results found.  No lab results found.    Invalid input(s): \"UC\"     "

## 2023-10-19 NOTE — PROGRESS NOTES
Pt has coverage for iv abx through [facility] [employer] [facility], covered at 100%.     ([facility]) In reference to admission date 10/16/2023 to check for iv abx coverage.     Please contact Intake with any questions, [phone] or In Basket pool, [facility] ([phone]).

## 2023-10-19 NOTE — PLAN OF CARE
Goal Outcome Evaluation:      Plan of Care Reviewed With: patient, caregiver, child    Overall Patient Progress: improvingOverall Patient Progress: improving  Patient discharging home with family. Mid line in and family spoke with home care regarding continued IV abx. All belongings are packed up and family states all belongings present. Discharge home to follow. Last dose of hospital abx currently running. Continue POC until discharge.

## 2023-10-19 NOTE — PLAN OF CARE
"Goal Outcome Evaluation:      Plan of Care Reviewed With: patient    Overall Patient Progress: improvingOverall Patient Progress: improving     /81 (BP Location: Right arm, Patient Position: Supine, Cuff Size: Adult Regular)   Pulse 80   Temp 97.4  F (36.3  C) (Oral)   Resp 17   Ht 1.549 m (5' 1\")   Wt 60 kg (132 lb 4.4 oz)   SpO2 97%   BMI 24.99 kg/m      VS: VSS on RA.  Pain: Denies pain.  Lines: PIV SL, but painful per pt when flushed. PIV removed and RN attempted to place PIV, but unsuccessful. No flying RN on NOC. RN to pass off to day shift.    Cognitive: Aox4.  Respiratory: LS clear. No SOB or CABALLERO reported.  Cardiac: Denies CP.  Peripheral neurovascular: No edema, numbness, tingling, or tenderness.   GI: Last BM 10/18. Denies N/V.  : Voiding spontaneously.   Skin: WDL. See flowsheet for assessment.   Diet: Regular.  Activity: A2 w/ GB and walker.   Labs: B. Bcx: NGTD.  Plan: Continue IV Rocephin and PO decadron. Likely discharge home when medically stable. Daughter in room assisting w/ translation. Continue w/ POC.          "

## 2023-10-19 NOTE — PROCEDURES
[facility]    Single Lumen Midline Placement    Date/Time: 10/19/2023 2:23 PM    Performed by: Dayanara Wise RN  Authorized by: Claudio Vega MD  Indications: vascular access      UNIVERSAL PROTOCOL   Site Marked: Yes  Prior Images Obtained and Reviewed:  Yes  Required items: Required blood products, implants, devices and special equipment available    Patient identity confirmed:  Verbally with patient, hospital-assigned identification number, arm band and provided demographic data  NA - No sedation, light sedation, or local anesthesia  Confirmation Checklist:  Patient's identity using two indicators, relevant allergies, procedure was appropriate and matched the consent or emergent situation and correct equipment/implants were available  Time out: Immediately prior to the procedure a time out was called (Katia SWEET)    [place] Protocol: the Joint Commission Universal Protocol was followed    Preparation: Patient was prepped and draped in usual sterile fashion       ANESTHESIA    Anesthesia:  Local infiltration  Local Anesthetic:  Lidocaine 2% with epinephrine  Anesthetic Total (mL):  2      SEDATION    Patient Sedated: No        Preparation: skin prepped with ChloraPrep  Skin prep agent: skin prep agent completely dried prior to procedure  Sterile barriers: maximum sterile barriers were used: cap, mask, sterile gown, sterile gloves, and large sterile sheet  Hand hygiene: hand hygiene performed prior to central venous catheter insertion  Type of line used: Midline  Catheter type: single lumen  Lumen type: non-valved  Catheter size: 4 Fr  Brand: Bard  Lot number: [id number]  Placement method: venipuncture, MST and ultrasound  Number of attempts: 1  Difficulty threading catheter: no  Successful placement: yes  Orientation: right    Location: brachial vein (medial) (4.4mm)  Tip Location: distal to axillary vein  Arm circumference: adults 10 cm  Extremity circumference: 23  Visible  catheter length: 2  Internal length: 10 cm  Total catheter length: 12  Dressing and securement: alcohol impregnated caps, blood cleaned with CHG, chlorhexidine disc applied, blood removed, dressing applied, gloves changed prior to final dressing, secure lock, securement device, site cleansed, subcutaneous anchor securement system, transparent dressing and sterile dressing applied  Post procedure assessment: blood return through all ports and free fluid flow  PROCEDURE   Patient Tolerance:  Patient tolerated the procedure well with no immediate complications   Midline in place ready to use . Katia jj RN updated

## 2023-10-19 NOTE — PROGRESS NOTES
Care Management Follow Up    Length of Stay (days): 2    Expected Discharge Date: 10/19/2023     Concerns to be Addressed: discharge planning     Patient plan of care discussed at interdisciplinary rounds: Yes    Anticipated Discharge Disposition:  home w HC     Anticipated Discharge Services:  [facility]  Anticipated Discharge DME:      Patient/family educated on Medicare website which has current facility and service quality ratings:    Education Provided on the Discharge Plan:    Patient/Family in Agreement with the Plan:      Referrals Placed by CM/SW:    Private pay costs discussed: insurance costs out of pocket expenses    Additional Information:  CM sent benefit check for IV Rocephin q 24 hours to [facility].    Addendum 1240.   Patient has 100% coverage through insurance. CM received call from [facility] Liaison that the family is going with [facility] and once Midline placed to let her know and she will come do the teaching. CM discussed with bedside RN. Midline pending.    1300 Addendum  CM informed [facility] RN Liaison midline wa being placed.    Addendum 1415:  Care Management Discharge Note    Discharge Date: 10/19/2023       Discharge Disposition:  home w family and home Infusion    Discharge Services:      Discharge DME:      Discharge Transportation: family or friend will provide    Private pay costs discussed: Not applicable    Does the patient's insurance plan have a 3 day qualifying hospital stay waiver?  No    Education Provided on the Discharge Plan:    Persons Notified of Discharge Plans: aware  Patient/Family in Agreement with the Plan:      Handoff Referral Completed: Yes    Additional Information:  Patient discharging to home with family and [facility] for antibiotics. RN Liaison here to do teaching.      Elsi Mora, RN, BSN, CM  Inpatient Care Coordination  [facility]  [phone]

## 2023-10-19 NOTE — DISCHARGE INSTRUCTIONS
Your home infusion referral was sent to [facility]  If you haven't heard from them regarding supply delivery, nurse visits, or have questions,  Please call them at [phone]

## 2023-10-20 LAB
ENTEROCOCCUS FAECALIS: NOT DETECTED
ENTEROCOCCUS FAECIUM: NOT DETECTED
LISTERIA SPECIES (DETECTED/NOT DETECTED): NOT DETECTED
STAPHYLOCOCCUS AUREUS: NOT DETECTED
STAPHYLOCOCCUS EPIDERMIDIS: NOT DETECTED
STAPHYLOCOCCUS LUGDUNENSIS: NOT DETECTED
STAPHYLOCOCCUS SPECIES: NOT DETECTED
STREPTOCOCCUS AGALACTIAE: NOT DETECTED
STREPTOCOCCUS ANGINOSUS GROUP: NOT DETECTED
STREPTOCOCCUS PNEUMONIAE: NOT DETECTED
STREPTOCOCCUS PYOGENES: NOT DETECTED
STREPTOCOCCUS SPECIES: NOT DETECTED

## 2023-10-21 NOTE — PROGRESS NOTES
Post-discharge note    Notified by micro lab about 1 of 4 blood cx growing diptheroids.  Suspect this is a contaminant.  No treatment needed

## 2023-10-22 LAB
BACTERIA BLD CULT: NO GROWTH
BACTERIA BLD CULT: NO GROWTH

## 2023-10-24 DIAGNOSIS — B95.4 OTHER STREPTOCOCCUS AS THE CAUSE OF DISEASES CLASSIFIED ELSEWHERE: Primary | ICD-10-CM

## 2023-10-24 DIAGNOSIS — R78.81 BACTEREMIA: ICD-10-CM

## 2023-10-24 LAB
BACTERIA BLD CULT: ABNORMAL
BACTERIA BLD CULT: ABNORMAL

## 2023-10-25 ENCOUNTER — LAB (OUTPATIENT)
Dept: LAB | Facility: CLINIC | Age: 88
End: 2023-10-25
Payer: COMMERCIAL

## 2023-10-25 DIAGNOSIS — R78.81 BACTEREMIA: ICD-10-CM

## 2023-10-25 DIAGNOSIS — B95.4 OTHER STREPTOCOCCUS AS THE CAUSE OF DISEASES CLASSIFIED ELSEWHERE: ICD-10-CM

## 2023-10-25 LAB
BASOPHILS # BLD AUTO: 0 10E3/UL (ref 0–0.2)
BASOPHILS NFR BLD AUTO: 0 %
CREAT SERPL-MCNC: 2.5 MG/DL (ref 0.67–1.17)
EGFRCR SERPLBLD CKD-EPI 2021: 24 ML/MIN/1.73M2
EOSINOPHIL # BLD AUTO: 0.2 10E3/UL (ref 0–0.7)
EOSINOPHIL NFR BLD AUTO: 2 %
ERYTHROCYTE [DISTWIDTH] IN BLOOD BY AUTOMATED COUNT: 13 % (ref 10–15)
HCT VFR BLD AUTO: 36.5 % (ref 40–53)
HGB BLD-MCNC: 12 G/DL (ref 13.3–17.7)
IMM GRANULOCYTES # BLD: 0.1 10E3/UL
IMM GRANULOCYTES NFR BLD: 1 %
LYMPHOCYTES # BLD AUTO: 1.7 10E3/UL (ref 0.8–5.3)
LYMPHOCYTES NFR BLD AUTO: 17 %
MCH RBC QN AUTO: 30.3 PG (ref 26.5–33)
MCHC RBC AUTO-ENTMCNC: 32.9 G/DL (ref 31.5–36.5)
MCV RBC AUTO: 92 FL (ref 78–100)
MONOCYTES # BLD AUTO: 0.8 10E3/UL (ref 0–1.3)
MONOCYTES NFR BLD AUTO: 8 %
NEUTROPHILS # BLD AUTO: 6.9 10E3/UL (ref 1.6–8.3)
NEUTROPHILS NFR BLD AUTO: 71 %
PLATELET # BLD AUTO: 287 10E3/UL (ref 150–450)
RBC # BLD AUTO: 3.96 10E6/UL (ref 4.4–5.9)
WBC # BLD AUTO: 9.6 10E3/UL (ref 4–11)

## 2023-10-25 PROCEDURE — 36415 COLL VENOUS BLD VENIPUNCTURE: CPT

## 2023-10-25 PROCEDURE — 82565 ASSAY OF CREATININE: CPT

## 2023-10-25 PROCEDURE — 84450 TRANSFERASE (AST) (SGOT): CPT

## 2023-10-25 PROCEDURE — 85025 COMPLETE CBC W/AUTO DIFF WBC: CPT

## 2023-10-25 PROCEDURE — 84460 ALANINE AMINO (ALT) (SGPT): CPT

## 2023-10-26 LAB
ALT SERPL W P-5'-P-CCNC: NORMAL U/L
AST SERPL W P-5'-P-CCNC: NORMAL U/L

## 2023-10-30 DIAGNOSIS — R78.81 BACTEREMIA: Primary | ICD-10-CM

## 2023-11-07 ENCOUNTER — LAB (OUTPATIENT)
Dept: LAB | Facility: CLINIC | Age: 88
End: 2023-11-07
Payer: COMMERCIAL

## 2023-11-07 DIAGNOSIS — B95.4 OTHER STREPTOCOCCUS AS THE CAUSE OF DISEASES CLASSIFIED ELSEWHERE: ICD-10-CM

## 2023-11-07 DIAGNOSIS — R78.81 BACTEREMIA: ICD-10-CM

## 2023-11-07 LAB
ALT SERPL W P-5'-P-CCNC: 259 U/L (ref 0–70)
AST SERPL W P-5'-P-CCNC: 63 U/L (ref 0–45)
BASOPHILS # BLD AUTO: 0.1 10E3/UL (ref 0–0.2)
BASOPHILS NFR BLD AUTO: 1 %
CREAT SERPL-MCNC: 2.22 MG/DL (ref 0.67–1.17)
EGFRCR SERPLBLD CKD-EPI 2021: 27 ML/MIN/1.73M2
EOSINOPHIL # BLD AUTO: 0.4 10E3/UL (ref 0–0.7)
EOSINOPHIL NFR BLD AUTO: 4 %
ERYTHROCYTE [DISTWIDTH] IN BLOOD BY AUTOMATED COUNT: 13.5 % (ref 10–15)
HCT VFR BLD AUTO: 38.5 % (ref 40–53)
HGB BLD-MCNC: 12.5 G/DL (ref 13.3–17.7)
IMM GRANULOCYTES # BLD: 0.1 10E3/UL
IMM GRANULOCYTES NFR BLD: 1 %
LYMPHOCYTES # BLD AUTO: 2.6 10E3/UL (ref 0.8–5.3)
LYMPHOCYTES NFR BLD AUTO: 32 %
MCH RBC QN AUTO: 30 PG (ref 26.5–33)
MCHC RBC AUTO-ENTMCNC: 32.5 G/DL (ref 31.5–36.5)
MCV RBC AUTO: 93 FL (ref 78–100)
MONOCYTES # BLD AUTO: 0.4 10E3/UL (ref 0–1.3)
MONOCYTES NFR BLD AUTO: 5 %
NEUTROPHILS # BLD AUTO: 4.6 10E3/UL (ref 1.6–8.3)
NEUTROPHILS NFR BLD AUTO: 56 %
PLATELET # BLD AUTO: 235 10E3/UL (ref 150–450)
RBC # BLD AUTO: 4.16 10E6/UL (ref 4.4–5.9)
WBC # BLD AUTO: 8.1 10E3/UL (ref 4–11)

## 2023-11-07 PROCEDURE — 84460 ALANINE AMINO (ALT) (SGPT): CPT | Performed by: INTERNAL MEDICINE

## 2023-11-07 PROCEDURE — 84450 TRANSFERASE (AST) (SGOT): CPT | Performed by: INTERNAL MEDICINE

## 2023-11-07 PROCEDURE — 36415 COLL VENOUS BLD VENIPUNCTURE: CPT | Performed by: INTERNAL MEDICINE

## 2023-11-07 PROCEDURE — 85025 COMPLETE CBC W/AUTO DIFF WBC: CPT | Performed by: INTERNAL MEDICINE

## 2023-11-07 PROCEDURE — 87040 BLOOD CULTURE FOR BACTERIA: CPT | Performed by: INTERNAL MEDICINE

## 2023-11-07 PROCEDURE — 82565 ASSAY OF CREATININE: CPT | Performed by: INTERNAL MEDICINE

## 2023-11-12 LAB
BACTERIA BLD CULT: NO GROWTH
BACTERIA BLD CULT: NO GROWTH

## 2024-09-23 ENCOUNTER — APPOINTMENT (OUTPATIENT)
Dept: CT IMAGING | Facility: CLINIC | Age: 89
End: 2024-09-23
Payer: COMMERCIAL

## 2024-09-23 ENCOUNTER — APPOINTMENT (OUTPATIENT)
Dept: NUCLEAR MEDICINE | Facility: CLINIC | Age: 89
End: 2024-09-23
Attending: PHYSICIAN ASSISTANT
Payer: COMMERCIAL

## 2024-09-23 ENCOUNTER — HOSPITAL ENCOUNTER (OUTPATIENT)
Facility: CLINIC | Age: 89
Setting detail: OBSERVATION
Discharge: HOME | End: 2024-09-25
Attending: PHYSICIAN ASSISTANT | Admitting: INTERNAL MEDICINE
Payer: COMMERCIAL

## 2024-09-23 ENCOUNTER — APPOINTMENT (OUTPATIENT)
Dept: GENERAL RADIOLOGY | Facility: CLINIC | Age: 89
End: 2024-09-23
Attending: PHYSICIAN ASSISTANT
Payer: COMMERCIAL

## 2024-09-23 DIAGNOSIS — K92.1 HEMATOCHEZIA: ICD-10-CM

## 2024-09-23 DIAGNOSIS — R79.89 ELEVATED D-DIMER: ICD-10-CM

## 2024-09-23 DIAGNOSIS — R06.00 DYSPNEA: ICD-10-CM

## 2024-09-23 DIAGNOSIS — Z51.5 HOSPICE CARE PATIENT: Primary | ICD-10-CM

## 2024-09-23 DIAGNOSIS — R42 DIZZINESS: ICD-10-CM

## 2024-09-23 DIAGNOSIS — R79.89 ELEVATED TROPONIN: ICD-10-CM

## 2024-09-23 DIAGNOSIS — R53.1 GENERALIZED WEAKNESS: ICD-10-CM

## 2024-09-23 LAB
ABO/RH(D): NORMAL
ALBUMIN SERPL BCG-MCNC: 4.2 G/DL (ref 3.5–5.2)
ALP SERPL-CCNC: 78 U/L (ref 40–150)
ALT SERPL W P-5'-P-CCNC: 14 U/L (ref 0–70)
ANION GAP SERPL CALCULATED.3IONS-SCNC: 15 MMOL/L (ref 7–15)
ANTIBODY SCREEN: NEGATIVE
AST SERPL W P-5'-P-CCNC: 19 U/L (ref 0–45)
ATRIAL RATE - MUSE: 102 BPM
BASOPHILS # BLD AUTO: 0 10E3/UL (ref 0–0.2)
BASOPHILS NFR BLD AUTO: 0 %
BILIRUB SERPL-MCNC: 0.5 MG/DL
BUN SERPL-MCNC: 38.2 MG/DL (ref 8–23)
CALCIUM SERPL-MCNC: 9.4 MG/DL (ref 8.8–10.4)
CHLORIDE SERPL-SCNC: 105 MMOL/L (ref 98–107)
CREAT SERPL-MCNC: 3.68 MG/DL (ref 0.67–1.17)
D DIMER PPP FEU-MCNC: 1.03 UG/ML FEU (ref 0–0.5)
DIASTOLIC BLOOD PRESSURE - MUSE: NORMAL MMHG
EGFRCR SERPLBLD CKD-EPI 2021: 15 ML/MIN/1.73M2
EOSINOPHIL # BLD AUTO: 0.4 10E3/UL (ref 0–0.7)
EOSINOPHIL NFR BLD AUTO: 5 %
ERYTHROCYTE [DISTWIDTH] IN BLOOD BY AUTOMATED COUNT: 13.6 % (ref 10–15)
FLUAV RNA SPEC QL NAA+PROBE: NEGATIVE
FLUBV RNA RESP QL NAA+PROBE: NEGATIVE
GLUCOSE BLDC GLUCOMTR-MCNC: 128 MG/DL (ref 70–99)
GLUCOSE SERPL-MCNC: 131 MG/DL (ref 70–99)
HCO3 SERPL-SCNC: 20 MMOL/L (ref 22–29)
HCT VFR BLD AUTO: 37.1 % (ref 40–53)
HEMOCCULT STL QL: POSITIVE
HGB BLD-MCNC: 10.2 G/DL (ref 13.3–17.7)
HGB BLD-MCNC: 12 G/DL (ref 13.3–17.7)
IMM GRANULOCYTES # BLD: 0 10E3/UL
IMM GRANULOCYTES NFR BLD: 1 %
INR PPP: 0.98 (ref 0.85–1.15)
INTERPRETATION ECG - MUSE: NORMAL
LYMPHOCYTES # BLD AUTO: 1.7 10E3/UL (ref 0.8–5.3)
LYMPHOCYTES NFR BLD AUTO: 22 %
MCH RBC QN AUTO: 28.6 PG (ref 26.5–33)
MCHC RBC AUTO-ENTMCNC: 32.3 G/DL (ref 31.5–36.5)
MCV RBC AUTO: 89 FL (ref 78–100)
MONOCYTES # BLD AUTO: 0.4 10E3/UL (ref 0–1.3)
MONOCYTES NFR BLD AUTO: 5 %
NEUTROPHILS # BLD AUTO: 5.2 10E3/UL (ref 1.6–8.3)
NEUTROPHILS NFR BLD AUTO: 67 %
NRBC # BLD AUTO: 0 10E3/UL
NRBC BLD AUTO-RTO: 0 /100
NT-PROBNP SERPL-MCNC: 783 PG/ML (ref 0–1800)
P AXIS - MUSE: 40 DEGREES
PLATELET # BLD AUTO: 272 10E3/UL (ref 150–450)
POTASSIUM SERPL-SCNC: 4.6 MMOL/L (ref 3.4–5.3)
PR INTERVAL - MUSE: 184 MS
PROT SERPL-MCNC: 8.2 G/DL (ref 6.4–8.3)
QRS DURATION - MUSE: 72 MS
QT - MUSE: 352 MS
QTC - MUSE: 458 MS
R AXIS - MUSE: 51 DEGREES
RBC # BLD AUTO: 4.19 10E6/UL (ref 4.4–5.9)
RSV RNA SPEC NAA+PROBE: NEGATIVE
SARS-COV-2 RNA RESP QL NAA+PROBE: NEGATIVE
SODIUM SERPL-SCNC: 140 MMOL/L (ref 135–145)
SPECIMEN EXPIRATION DATE: NORMAL
SYSTOLIC BLOOD PRESSURE - MUSE: NORMAL MMHG
T AXIS - MUSE: 49 DEGREES
TROPONIN T SERPL HS-MCNC: 38 NG/L
TROPONIN T SERPL HS-MCNC: 44 NG/L
VENTRICULAR RATE- MUSE: 102 BPM
WBC # BLD AUTO: 7.9 10E3/UL (ref 4–11)

## 2024-09-23 PROCEDURE — G0378 HOSPITAL OBSERVATION PER HR: HCPCS

## 2024-09-23 PROCEDURE — 85610 PROTHROMBIN TIME: CPT | Performed by: PHYSICIAN ASSISTANT

## 2024-09-23 PROCEDURE — 36415 COLL VENOUS BLD VENIPUNCTURE: CPT | Performed by: PHYSICIAN ASSISTANT

## 2024-09-23 PROCEDURE — 83880 ASSAY OF NATRIURETIC PEPTIDE: CPT | Performed by: PHYSICIAN ASSISTANT

## 2024-09-23 PROCEDURE — 80053 COMPREHEN METABOLIC PANEL: CPT | Performed by: PHYSICIAN ASSISTANT

## 2024-09-23 PROCEDURE — 85018 HEMOGLOBIN: CPT

## 2024-09-23 PROCEDURE — 82962 GLUCOSE BLOOD TEST: CPT

## 2024-09-23 PROCEDURE — 86900 BLOOD TYPING SEROLOGIC ABO: CPT | Performed by: PHYSICIAN ASSISTANT

## 2024-09-23 PROCEDURE — 99222 1ST HOSP IP/OBS MODERATE 55: CPT

## 2024-09-23 PROCEDURE — 71046 X-RAY EXAM CHEST 2 VIEWS: CPT

## 2024-09-23 PROCEDURE — 93005 ELECTROCARDIOGRAM TRACING: CPT

## 2024-09-23 PROCEDURE — 85379 FIBRIN DEGRADATION QUANT: CPT | Performed by: PHYSICIAN ASSISTANT

## 2024-09-23 PROCEDURE — 250N000013 HC RX MED GY IP 250 OP 250 PS 637

## 2024-09-23 PROCEDURE — 99285 EMERGENCY DEPT VISIT HI MDM: CPT | Mod: 25

## 2024-09-23 PROCEDURE — 272N000035 NM LUNG SCAN VENTILATION AND PERFUSION

## 2024-09-23 PROCEDURE — 343N000001 HC RX 343: Performed by: PHYSICIAN ASSISTANT

## 2024-09-23 PROCEDURE — 87637 SARSCOV2&INF A&B&RSV AMP PRB: CPT | Performed by: PHYSICIAN ASSISTANT

## 2024-09-23 PROCEDURE — 85025 COMPLETE CBC W/AUTO DIFF WBC: CPT | Performed by: PHYSICIAN ASSISTANT

## 2024-09-23 PROCEDURE — 71250 CT THORAX DX C-: CPT

## 2024-09-23 PROCEDURE — 82272 OCCULT BLD FECES 1-3 TESTS: CPT | Performed by: PHYSICIAN ASSISTANT

## 2024-09-23 PROCEDURE — 36415 COLL VENOUS BLD VENIPUNCTURE: CPT

## 2024-09-23 PROCEDURE — 84484 ASSAY OF TROPONIN QUANT: CPT | Performed by: PHYSICIAN ASSISTANT

## 2024-09-23 PROCEDURE — 258N000003 HC RX IP 258 OP 636: Performed by: PHYSICIAN ASSISTANT

## 2024-09-23 PROCEDURE — A9567 TECHNETIUM TC-99M AEROSOL: HCPCS | Performed by: PHYSICIAN ASSISTANT

## 2024-09-23 PROCEDURE — A9540 TC99M MAA: HCPCS | Performed by: PHYSICIAN ASSISTANT

## 2024-09-23 RX ORDER — ACETAMINOPHEN 325 MG/1
650 TABLET ORAL EVERY 4 HOURS PRN
Status: DISCONTINUED | OUTPATIENT
Start: 2024-09-23 | End: 2024-09-25 | Stop reason: HOSPADM

## 2024-09-23 RX ORDER — AMLODIPINE BESYLATE 10 MG/1
10 TABLET ORAL DAILY
Status: DISCONTINUED | OUTPATIENT
Start: 2024-09-23 | End: 2024-09-25 | Stop reason: HOSPADM

## 2024-09-23 RX ORDER — SODIUM CHLORIDE, SODIUM LACTATE, POTASSIUM CHLORIDE, CALCIUM CHLORIDE 600; 310; 30; 20 MG/100ML; MG/100ML; MG/100ML; MG/100ML
INJECTION, SOLUTION INTRAVENOUS CONTINUOUS
Status: DISCONTINUED | OUTPATIENT
Start: 2024-09-24 | End: 2024-09-24

## 2024-09-23 RX ORDER — TAMSULOSIN HYDROCHLORIDE 0.4 MG/1
0.4 CAPSULE ORAL DAILY
Status: DISCONTINUED | OUTPATIENT
Start: 2024-09-23 | End: 2024-09-25 | Stop reason: HOSPADM

## 2024-09-23 RX ORDER — PROCHLORPERAZINE 25 MG
12.5 SUPPOSITORY, RECTAL RECTAL EVERY 12 HOURS PRN
Status: DISCONTINUED | OUTPATIENT
Start: 2024-09-23 | End: 2024-09-25 | Stop reason: HOSPADM

## 2024-09-23 RX ORDER — GLIPIZIDE 5 MG/1
5 TABLET, FILM COATED, EXTENDED RELEASE ORAL DAILY
Status: DISCONTINUED | OUTPATIENT
Start: 2024-09-23 | End: 2024-09-24

## 2024-09-23 RX ORDER — PROCHLORPERAZINE MALEATE 5 MG
5 TABLET ORAL EVERY 6 HOURS PRN
Status: DISCONTINUED | OUTPATIENT
Start: 2024-09-23 | End: 2024-09-25 | Stop reason: HOSPADM

## 2024-09-23 RX ORDER — NICOTINE POLACRILEX 4 MG
15-30 LOZENGE BUCCAL
Status: DISCONTINUED | OUTPATIENT
Start: 2024-09-23 | End: 2024-09-25 | Stop reason: HOSPADM

## 2024-09-23 RX ORDER — AMOXICILLIN 250 MG
1 CAPSULE ORAL 2 TIMES DAILY PRN
Status: DISCONTINUED | OUTPATIENT
Start: 2024-09-23 | End: 2024-09-25 | Stop reason: HOSPADM

## 2024-09-23 RX ORDER — DEXTROSE MONOHYDRATE 25 G/50ML
25-50 INJECTION, SOLUTION INTRAVENOUS
Status: DISCONTINUED | OUTPATIENT
Start: 2024-09-23 | End: 2024-09-25 | Stop reason: HOSPADM

## 2024-09-23 RX ORDER — ONDANSETRON 2 MG/ML
4 INJECTION INTRAMUSCULAR; INTRAVENOUS EVERY 6 HOURS PRN
Status: DISCONTINUED | OUTPATIENT
Start: 2024-09-23 | End: 2024-09-25 | Stop reason: HOSPADM

## 2024-09-23 RX ORDER — LIDOCAINE 40 MG/G
CREAM TOPICAL
Status: DISCONTINUED | OUTPATIENT
Start: 2024-09-23 | End: 2024-09-25 | Stop reason: HOSPADM

## 2024-09-23 RX ORDER — TRIAMCINOLONE ACETONIDE 1 MG/G
OINTMENT TOPICAL 2 TIMES DAILY PRN
COMMUNITY

## 2024-09-23 RX ORDER — ONDANSETRON 4 MG/1
4 TABLET, ORALLY DISINTEGRATING ORAL EVERY 6 HOURS PRN
Status: DISCONTINUED | OUTPATIENT
Start: 2024-09-23 | End: 2024-09-25 | Stop reason: HOSPADM

## 2024-09-23 RX ORDER — AMOXICILLIN 250 MG
2 CAPSULE ORAL 2 TIMES DAILY PRN
Status: DISCONTINUED | OUTPATIENT
Start: 2024-09-23 | End: 2024-09-25 | Stop reason: HOSPADM

## 2024-09-23 RX ORDER — ACETAMINOPHEN 650 MG/1
650 SUPPOSITORY RECTAL EVERY 4 HOURS PRN
Status: DISCONTINUED | OUTPATIENT
Start: 2024-09-23 | End: 2024-09-25 | Stop reason: HOSPADM

## 2024-09-23 RX ADMIN — KIT FOR THE PREPARATION OF TECHNETIUM TC 99M PENTETATE 65 MILLICURIE: 20 INJECTION, POWDER, LYOPHILIZED, FOR SOLUTION INTRAVENOUS; RESPIRATORY (INHALATION) at 13:58

## 2024-09-23 RX ADMIN — TAMSULOSIN HYDROCHLORIDE 0.4 MG: 0.4 CAPSULE ORAL at 16:03

## 2024-09-23 RX ADMIN — SODIUM CHLORIDE 1000 ML: 9 INJECTION, SOLUTION INTRAVENOUS at 10:15

## 2024-09-23 RX ADMIN — POLYETHYLENE GLYCOL 3350, SODIUM SULFATE ANHYDROUS, SODIUM BICARBONATE, SODIUM CHLORIDE, POTASSIUM CHLORIDE 4000 ML: 236; 22.74; 6.74; 5.86; 2.97 POWDER, FOR SOLUTION ORAL at 17:16

## 2024-09-23 RX ADMIN — AMLODIPINE BESYLATE 10 MG: 10 TABLET ORAL at 16:03

## 2024-09-23 RX ADMIN — KIT FOR THE PREPARATION OF TECHNETIUM TC 99M ALBUMIN AGGREGATED 6.5 MILLICURIE: 2.5 INJECTION, POWDER, FOR SOLUTION INTRAVENOUS at 13:58

## 2024-09-23 ASSESSMENT — ACTIVITIES OF DAILY LIVING (ADL)
ADLS_ACUITY_SCORE: 38
ADLS_ACUITY_SCORE: 46
ADLS_ACUITY_SCORE: 42
ADLS_ACUITY_SCORE: 46
ADLS_ACUITY_SCORE: 42
ADLS_ACUITY_SCORE: 46

## 2024-09-23 ASSESSMENT — COLUMBIA-SUICIDE SEVERITY RATING SCALE - C-SSRS
6. HAVE YOU EVER DONE ANYTHING, STARTED TO DO ANYTHING, OR PREPARED TO DO ANYTHING TO END YOUR LIFE?: NO
2. HAVE YOU ACTUALLY HAD ANY THOUGHTS OF KILLING YOURSELF IN THE PAST MONTH?: NO
1. IN THE PAST MONTH, HAVE YOU WISHED YOU WERE DEAD OR WISHED YOU COULD GO TO SLEEP AND NOT WAKE UP?: NO

## 2024-09-23 NOTE — ED TRIAGE NOTES
Arrives with daughters with c/o blood in stool, SOB and generalized weakness. Daughter noticed blood in stool yesterday. SOB X2 days. Poor appetite. 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 Romanian speaking.      Triage Assessment (Adult)       Row Name 09/23/24 0938          Triage Assessment    Airway WDL WDL        Respiratory WDL    Respiratory WDL WDL        Skin Circulation/Temperature WDL    Skin Circulation/Temperature WDL WDL        Cardiac WDL    Cardiac WDL WDL        Peripheral/Neurovascular WDL    Peripheral Neurovascular WDL WDL        Cognitive/Neuro/Behavioral WDL    Cognitive/Neuro/Behavioral WDL WDL

## 2024-09-23 NOTE — ED NOTES
"[facility]  ED Nurse Handoff Report    ED Chief complaint: Melena and Shortness of Breath  . ED Diagnosis:   Final diagnoses:   Dyspnea   Dizziness   Generalized weakness   Hematochezia   Elevated troponin   Elevated d-dimer       Allergies:   Allergies   Allergen Reactions    Ibuprofen Other (See Comments)       Code Status: DNR / DNI    Activity level - Baseline/Home:  assist of 1.  Activity Level - Current:   assist of 1.   Lift room needed: No.   Bariatric: No   Needed: Yes two dtr here and are interpreting Papi  Isolation: No.   Infection: Not Applicable.     Respiratory status: Nasal cannula    Vital Signs (within 30 minutes):   Vitals:    09/23/24 0937 09/23/24 1021 09/23/24 1036   BP: (!) 124/105 (!) 142/86 (!) 137/92   Pulse: 113 95 92   Resp: 18 14 16   Temp: 98.5  F (36.9  C)     TempSrc: Temporal     SpO2: 97%     Weight: 58.9 kg (129 lb 13.6 oz)     Height: 1.575 m (5' 2\")         Cardiac Rhythm:  ,      Pain level:    Patient confused: No.   Patient Falls Risk: patient and family education.   Elimination Status: Has voided     Patient Report - Initial Complaint: rectum bleed.   Focused Assessment: gi/cardiac/vitals , labs     Abnormal Results:   Labs Ordered and Resulted from Time of ED Arrival to Time of ED Departure   COMPREHENSIVE METABOLIC PANEL - Abnormal       Result Value    Sodium 140      Potassium 4.6      Carbon Dioxide (CO2) 20 (*)     Anion Gap 15      Urea Nitrogen 38.2 (*)     Creatinine 3.68 (*)     GFR Estimate 15 (*)     Calcium 9.4      Chloride 105      Glucose 131 (*)     Alkaline Phosphatase 78      AST 19      ALT 14      Protein Total 8.2      Albumin 4.2      Bilirubin Total 0.5     OCCULT BLOOD STOOL - Abnormal    Occult Blood Positive (*)    CBC WITH PLATELETS AND DIFFERENTIAL - Abnormal    WBC Count 7.9      RBC Count 4.19 (*)     Hemoglobin 12.0 (*)     Hematocrit 37.1 (*)     MCV 89      MCH 28.6      MCHC 32.3      RDW 13.6      Platelet " Count 272      % Neutrophils 67      % Lymphocytes 22      % Monocytes 5      % Eosinophils 5      % Basophils 0      % Immature Granulocytes 1      NRBCs per 100 WBC 0      Absolute Neutrophils 5.2      Absolute Lymphocytes 1.7      Absolute Monocytes 0.4      Absolute Eosinophils 0.4      Absolute Basophils 0.0      Absolute Immature Granulocytes 0.0      Absolute NRBCs 0.0     TROPONIN T, HIGH SENSITIVITY - Abnormal    Troponin T, High Sensitivity 44 (*)    D DIMER QUANTITATIVE - Abnormal    D-Dimer Quantitative 1.03 (*)    INR - Normal    INR 0.98     N TERMINAL PRO BNP OUTPATIENT - Normal    N Terminal Pro BNP Outpatient 783     INFLUENZA A/B, RSV, & SARS-COV2 PCR - Normal    Influenza A PCR Negative      Influenza B PCR Negative      RSV PCR Negative      SARS CoV2 PCR Negative     TROPONIN T, HIGH SENSITIVITY   TYPE AND SCREEN, ADULT    ABO/RH(D) B POS      Antibody Screen Negative      SPECIMEN EXPIRATION DATE [id number]     ABO/RH TYPE AND SCREEN        Chest XR,  PA & LAT   Final Result   IMPRESSION: No infiltrate, pleural effusion or pneumothorax. Mild   cardiomegaly.      SOL OH MD            SYSTEM ID:  [id number]      NM Lung Scan Ventilation and Perfusion    (Results Pending)       Treatments provided: fluids/ admit to obs  Family Comments: dtrs at side  OBS brochure/video discussed/provided to patient:  Yes  ED Medications:   Medications   sodium chloride 0.9% BOLUS 1,000 mL (1,000 mLs Intravenous $New Bag 9/23/24 1015)       Drips infusing:  No  For the majority of the shift this patient was Green.   Interventions performed were na  .    Sepsis treatment initiated: No    Cares/treatment/interventions/medications to be completed following ED care: see orders    ED Nurse Name: Bert CATHERINE Rolando, RN  11:34 AM

## 2024-09-23 NOTE — PROGRESS NOTES
9/23/2024  [facility]    ED Boarding Nurse Handoff Addendum Report:    Date/time: 9/23/2024, 1:02 PM    Activity Level: assist of 1    Fall Risk: Yes:  assistive device/personal items within reach    Active Infusions: none    Current Meds Due: none    Current care needs: none    Oxygen requirements (liters/min and/or FiO2): none    Respiratory status: Room air    Vital signs (within last 30 minutes):    Vitals:    09/23/24 1021 09/23/24 1036 09/23/24 1119 09/23/24 1300   BP: (!) 142/86 (!) 137/92     Pulse: 95 92 91    Resp: 14 16 16    Temp:    98  F (36.7  C)   TempSrc:    Temporal   SpO2:    96%   Weight:       Height:           Focused assessment within last 30 minutes:    none    ED Boarding Nurse name: Rosa Sargent RN

## 2024-09-23 NOTE — PHARMACY-ADMISSION MEDICATION HISTORY
Pharmacist Admission Medication History    Admission medication history is complete. The information provided in this note is only as accurate as the sources available at the time of the update.    Information Source(s): Family member and CareEverywhere/SureScripts via in-person    Pertinent Information: None    Changes made to PTA medication list:  Added:   Triamcinolone oint  Deleted: None  Changed: None    Allergies reviewed with patient and updates made in EHR: no    Medication History Completed By: Susana Fonseca [facility] 9/23/2024 12:25 PM    PTA Med List   Medication Sig Last Dose    acetaminophen (TYLENOL) 500 MG tablet Take 500-1,000 mg by mouth every 4 hours as needed for mild pain Unknown at PRN    amLODIPine (NORVASC) 10 MG tablet Take 10 mg by mouth daily 9/22/2024    glipiZIDE (GLUCOTROL XL) 2.5 MG 24 hr tablet Take 5 mg by mouth daily 9/22/2024 at AM    guaiFENesin-codeine (ROBITUSSIN AC) 100-10 MG/5ML solution Take 5 mLs by mouth nightly as needed for cough Unknown at PRN    LANsoprazole (PREVACID) 30 MG DR capsule Take 30 mg by mouth daily 9/22/2024    polyethylene glycol (MIRALAX) 17 g packet Take 1 packet by mouth daily as needed for constipation Unknown at PRN    simvastatin (ZOCOR) 20 MG tablet Take 20 mg by mouth at bedtime 9/22/2024    tamsulosin (FLOMAX) 0.4 MG capsule Take 0.4 mg by mouth daily 9/22/2024    triamcinolone (KENALOG) 0.1 % external ointment Apply topically 2 times daily as needed for irritation. Unknown at PRN

## 2024-09-23 NOTE — ED PROVIDER NOTES
"  Emergency Department Note      History of Present Illness     Chief Complaint   Melena and Shortness of Breath      HPI Daughters translating from Congolese. Pt and family decline formal .  Jessica Linda is a 89 year old male With history of type II DM, CKD, blindness, who presents for evaluation of bloody stool as well as shortness of breath and dizziness.  Patient states that he began feeling dizzy and short of breath yesterday and his daughters state this is when they first noted he complained about however he has been getting somewhat weaker with less of an appetite over the last several weeks and has lost a few pounds.  The patient denies any pain in chest, abdomen, or elsewhere.  Daughters have not noticed any fever.  One of his daughters lives with him.  Last night they noticed some blood in the toilet and were concerned thus bring him in this morning.  There has been no vomiting or hematemesis.  He is not on blood thinners.  The patient's daughter does state that about a year ago he was in the hospital and they were told that he may have a malignancy in his colon though decision was made not to pursue endoscopy given the patient's advanced age and comorbidities.  They and the patient would want him to be DNR/DNI.  Patient and family would want medical interventions such as blood transfusion and medications if it would help him to feel better at least temporarily.  They are uncertain if they would want to pursue colonoscopy/endoscopy.  Family notes patient is always very tired and \"sleeps a lot\" and that is not new. No cough or leg swelling    Independent Historian   Daughter as detailed above.  Daughters provide much of hx.  Review of External Notes   Reviewed Dr. Mathew  Family med note from 9/17/24 where pt seen and note poor appetite w/weight loss.   Ongoing balance issues.  Creatinine 3.5    Past Medical History     Medical History and Problem List   Past Medical History:   Diagnosis Date    " "Blindness of left eye     Constipation     Diabetes mellitus (H) 2008    Hyperlipidaemia     Hypertension     Knee pain, right        Medications   No current outpatient medications on file.      Surgical History   Past Surgical History:   Procedure Laterality Date    NO HISTORY OF SURGERY         Physical Exam     Patient Vitals for the past 24 hrs:   BP Temp Temp src Pulse Resp SpO2 Height Weight   09/23/24 1439 (!) 152/96 98.3  F (36.8  C) Oral 101 -- 97 % -- --   09/23/24 1341 -- -- -- 101 22 97 % -- --   09/23/24 1300 -- 98  F (36.7  C) Temporal -- -- 96 % -- --   09/23/24 1119 -- -- -- 91 16 -- -- --   09/23/24 1036 (!) 137/92 -- -- 92 16 -- -- --   09/23/24 1021 (!) 142/86 -- -- 95 14 -- -- --   09/23/24 0937 (!) 124/105 98.5  F (36.9  C) Temporal 113 18 97 % 1.575 m (5' 2\") 58.9 kg (129 lb 13.6 oz)     Physical Exam  General: Awake, alert, elderly appearing male lying on gurney  Head:  Scalp is NC/AT  Eyes:  Eyes closed (opens slightly to voice) baseline per family. PERRL  ENT:  The external nose and ears are normal.     Oropharynx clear, uvula midline.  Neck:  Normal range of motion without rigidity.  CV:  Tachycardic but regular    No pathologic murmur, rubs, or gallops.  Resp:  Breath sounds are clear bilaterally    Non-labored, no retractions or accessory muscle use  Abdomen: Abdomen is soft, no distension, no tenderness, no masses. No CVA tenderness. DMITRY w/purple brown stool in vault but no active bleeding/hemorrhage.  No hemorrhoids or fissures (performed in presence of family and RN chaparones)  MS:  No lower extremity edema/swelling.   Skin:  Warm and dry, No rash or lesions noted.  Neuro:  Alert and oriented.  GCS 14 (eyes) Moves all extremities normal.  No facial asymmetry  Psych: Awake. Alert. Normal affect. Appropriate interactions.      Diagnostics     Lab Results   Labs Ordered and Resulted from Time of ED Arrival to Time of ED Departure   COMPREHENSIVE METABOLIC PANEL - Abnormal       Result " Value    Sodium 140      Potassium 4.6      Carbon Dioxide (CO2) 20 (*)     Anion Gap 15      Urea Nitrogen 38.2 (*)     Creatinine 3.68 (*)     GFR Estimate 15 (*)     Calcium 9.4      Chloride 105      Glucose 131 (*)     Alkaline Phosphatase 78      AST 19      ALT 14      Protein Total 8.2      Albumin 4.2      Bilirubin Total 0.5     OCCULT BLOOD STOOL - Abnormal    Occult Blood Positive (*)    CBC WITH PLATELETS AND DIFFERENTIAL - Abnormal    WBC Count 7.9      RBC Count 4.19 (*)     Hemoglobin 12.0 (*)     Hematocrit 37.1 (*)     MCV 89      MCH 28.6      MCHC 32.3      RDW 13.6      Platelet Count 272      % Neutrophils 67      % Lymphocytes 22      % Monocytes 5      % Eosinophils 5      % Basophils 0      % Immature Granulocytes 1      NRBCs per 100 WBC 0      Absolute Neutrophils 5.2      Absolute Lymphocytes 1.7      Absolute Monocytes 0.4      Absolute Eosinophils 0.4      Absolute Basophils 0.0      Absolute Immature Granulocytes 0.0      Absolute NRBCs 0.0     TROPONIN T, HIGH SENSITIVITY - Abnormal    Troponin T, High Sensitivity 44 (*)    D DIMER QUANTITATIVE - Abnormal    D-Dimer Quantitative 1.03 (*)    TROPONIN T, HIGH SENSITIVITY - Abnormal    Troponin T, High Sensitivity 38 (*)    INR - Normal    INR 0.98     N TERMINAL PRO BNP OUTPATIENT - Normal    N Terminal Pro BNP Outpatient 783     INFLUENZA A/B, RSV, & SARS-COV2 PCR - Normal    Influenza A PCR Negative      Influenza B PCR Negative      RSV PCR Negative      SARS CoV2 PCR Negative     TYPE AND SCREEN, ADULT    ABO/RH(D) B POS      Antibody Screen Negative      SPECIMEN EXPIRATION DATE [id number]     ABO/RH TYPE AND SCREEN       Imaging   NM Lung Scan Ventilation and Perfusion   Final Result   IMPRESSION:      1. No mismatched perfusion defects to suggest acute pulmonary embolism.      2. Moderate-sized wedge-shaped ventilation and defect involving the anterosuperior left upper lobe. While this may represent sequelae of a chronic  pulmonary embolus, the exact etiology remains indeterminate.      Chest XR,  PA & LAT   Final Result   IMPRESSION: No infiltrate, pleural effusion or pneumothorax. Mild   cardiomegaly.      SOL OH MD            SYSTEM ID:  [id number]          ECG results from 09/23/24   EKG 12 lead     Value    Systolic Blood Pressure     Diastolic Blood Pressure     Ventricular Rate 102    Atrial Rate 102    MT Interval 184    QRS Duration 72        QTc 458    P Axis 40    R AXIS 51    T Axis 49    Interpretation ECG      Sinus tachycardia  Otherwise normal ECG     Independent Interpretation   Indpendently reviewed chest xray, note clear lungs, no edema, pneumothorax or infiltrate.    ED Course      Medications Administered   Medications   sodium chloride 0.9% BOLUS 1,000 mL (1,000 mLs Intravenous $New Bag 9/23/24 1015)   technetium pertechnetate with albumin (Tc99m MAA) radioisotope injection 3 millicurie (6.5 millicuries Intravenous $Given 9/23/24 1358)   technetium pentetate Tc99m (DTPA) inhaled radioisotope 50 millicurie (65 millicuries Inhalation $Given 9/23/24 1358)       Procedures   Procedures     Discussion of Management   Admitting Hospitalist, Sofya VELASQUEZ_C who agrees to accept for observation to Dr. Black.    ED Course        Additional Documentation  None    Medical Decision Making / Diagnosis     CMS Diagnoses: None    MIPS       None    MDM   Jessica Linda is a 89 year old male who presents for evaluation of shortness of breath, dizziness, as well as some bloody stool that was noticed by daughters last night.  Broad differential considered.  Does have evidence of hematochezia on exam though no active or brisk bleeding.  Interestingly hemoglobin is stable/normal at baseline raising the possibility that shortness of breath and dizziness could be from alternative cause.  No abdominal pain or tenderness to suggest intra-abdominal catastrophe and no indication for advanced imaging.  No melena hematemesis  strongly doubt upper GI bleed.  EKG shows mild sinus tachycardia which resolved with fluids.  Chest x-ray unremarkable.  Troponin mildly elevated but flat no chest pain doubt ACS.  BNP is normal there is no clinical evidence of congestive heart failure.  COVID and viral swab is negative.  There is no metabolic acidosis.  There is no bronchospasm stridor or evidence of airway obstruction anaphylaxis or asthma.  D-dimer was slightly elevated low clinical suspicion for PE VQ scan ordered and pending given poor renal function.  Regardless given his GI bleeding dyspnea and dizziness as well as his advanced age we will bring him to the hospital for observation pending VQ scan results.  I will obviously hold off on anticoagulation in light of his ongoing GI bleed.  He does not need blood transfusion at this time and there is no indication for emergent GI consult.  Discussed with hospitalist who agrees to accept.    Disposition   The patient was admitted to the hospital.     Diagnosis     ICD-10-CM    1. Dyspnea  R06.00       2. Dizziness  R42       3. Generalized weakness  R53.1       4. Hematochezia  K92.1       5. Elevated troponin  R79.89       6. Elevated d-dimer  R79.89            Discharge Medications   Current Discharge Medication List               Brad Gibbs PA-C  09/23/24 3276

## 2024-09-23 NOTE — H&P
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    Presents with his daughters. Family declined a .  Present for evaluation of rectal bleeding, shortness of breath, weakness that has developed over the last couple days. Daughters report he has chest pressure intermittently however this has been ongoing for several months and is unchanged.  Daughters have not seen any stool but found a small blood clot on the toilet seat yesterday at home and took a picture. No further bloody BM until after moving up to the floor he passed several more small blood clots with no stools. Possible hx of hemorrhoids. Never had colonoscopy.  Of note patient was last hospitalized in October 2023 for COVID-19 when he was found to have streptococcal bovis bacteremia.  He was discharged with IV antibiotics.  At that time was discussed with family that Streptococcus bovis infections can occur in the setting of colon cancer and would recommend a colonoscopy outpatient to rule out colon malignancy.  At that time patient family was declining any colonoscopy and had not pursued any further workup.  Family would like to pursue colonoscopy if possible during this admission given new rectal bleeding.  Denies any fevers, chills, chest pain, abdominal pain, nausea, or vomiting.  Had an episode where he felt like his abdomen popped last night no pain.  No regular NSAID or aspirin use.  No tobacco or alcohol use.  3 pound weight loss over the last couple days.    Rectal bleeding   Generalized weakness  Hx of streptococcus bovis bacteremia 10/2023  Unclear etiology for rectal bleeding.  Has no obvious external hemorrhoids on physical exam.  Unknown if a history of diverticulosis as he has never had  any colonoscopy or abdominal imaging. Denies abdominal pain, fever or chills.   - will order CT chest, abdomen pelvis w/o contrast due to CKD  - consult [facility], family would like to pursue colonoscopy if possible  - NPO at midnight   - repeat hemoglobin evening and AM  - monitor bloody stools  - PT/ SW consult     Shortness of breath  Chest pressure   Elevated D-dimer  Ventilation defect on VQ scan  Patient is resting comfortably, no increased work of breathing. Chest x-ray shows no infiltrate, effusion or pneumothorax, mild cardiomegaly. VQ scan was performed due to shortness of breath and elevated dimer which did not suggest any acute PE however it did show a moderate size wedge-shaped ventilation defect involving the anterior superior left upper lobe which may represent sequelae of chronic PE however exact etiology remains indeterminate per radiology read. No recent immobilization, long travel.   - CT chest, abdomen, pelvis as above to assess for any malignancy   - continuous pulse ox  - cardiac monitoring as precaution  - will hold on starting any anticoagulation with no obvious acute PE    CKD stage IV  Established care with nephrology outpatient in June 2024 due to increase in creatinine to 3.50. Suspected due to diabetes and HTN. Per nephrology note in June unclear etiology for worsening of kidney function. Renal US was ordered showed atrophic, echogenic appearance consistent with chronic medical renal disease, small cortical renal cyst, no renal mass or hydronephrosis.    - appears close to new baseline around 3.50    Elevated troponin  Troponin initially 44 followed by 38.  Was also chronically elevated last year during admission at 36 and 32.  Suspect elevation due to CKD. EKG shows sinus tachycardia. Lower suspicion for acute ACS.   - cardiac monitor overnight     Type II DM  - hold glipizide with poor oral intake  - blood glucose checks    HTN  HLD  - resume statin at discharge  - continue pta amlodipine  with hold parameters    BPH  - continue flomax    Depression  - not currently on any medication    Blindness in both eyes  - daughters very involved in care, planning on staying the night           Diet:  regular, NPO at midnight   DVT Prophylaxis: Pneumatic Compression Devices  Silver Catheter: Not present  Lines: PRESENT           Cardiac Monitoring: None  Code Status:  DNR/DNI    Clinically Significant Risk Factors Present on Admission                  # Hypertension: Noted on problem list                    Disposition Plan     Medically Ready for Discharge: Anticipated Tomorrow         The patient's care was discussed with the Bedside Nurse, Patient, and Patient's Family.    ZHANE Caceres PA-C  Hospitalist Service  [facility]  Securely message with [email or web address] (more info)  Text page via [facility] Paging/Directory     ______________________________________________________________________    Chief Complaint   Rectal bleeding    History is obtained from the patient and family.    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    Presents with his daughters. Family declined a .  Present for evaluation of hematochezia, shortness of breath, weakness that has developed over the last couple days. Daughters report he has chest pressure intermittently however this has been ongoing for several months and is unchanged.  Daughters have not seen any stool but found a small blood clot on the toilet seat yesterday at home and took a picture. No further bloody BM until after moving up to the floor he passed several more small blood clots with no stools. Possible hx of hemorrhoids. Never had colonoscopy.  Of note patient was last hospitalized in October 2023 for COVID-19 when he was found to have streptococcal bovis bacteremia.  He was discharged with IV antibiotics.  At  that time was discussed with family that Streptococcus bovis infections can occur in the setting of colon cancer and would recommend a colonoscopy outpatient to rule out colon malignancy.  At that time patient family was declining any colonoscopy and had not pursued any further workup.  Family would like to pursue colonoscopy if possible during this admission given new rectal bleeding.  Denies any fevers, chills, chest pain, abdominal pain, nausea, or vomiting.  Had an episode where he felt like his abdomen popped last night no pain.  No regular NSAID or aspirin use.  No tobacco or alcohol use.  3 pound weight loss over the last couple days.    In the ED, afebrile, initially tachycardic to 113 improved to 91, mildly hypertensive at 137/92, respirations 16, oxygen 96% on room air.  CMP is notable for creatinine of 3.68, BUN 38.2, CO2 20.  BNP 73.  Troponin 44 followed by 38.  CBC notable for hemoglobin of 12.  D-dimer 1.03.  Respiratory panel negative.  Chest x-ray shows no infiltrate, effusion or pneumothorax, mild cardiomegaly.  In the ED received 1 L of normal saline.    Past Medical History    Past Medical History:   Diagnosis Date    Blindness of left eye     Constipation     Diabetes mellitus (H) 2008    Hyperlipidaemia     Hypertension     Knee pain, right      Past Surgical History   Past Surgical History:   Procedure Laterality Date    NO HISTORY OF SURGERY       Prior to Admission Medications   Prior to Admission Medications   Prescriptions Last Dose Informant Patient Reported? Taking?   LANsoprazole (PREVACID) 30 MG DR capsule 9/22/2024  Yes Yes   Sig: Take 30 mg by mouth daily   acetaminophen (TYLENOL) 500 MG tablet Unknown at PRN  Yes Yes   Sig: Take 500-1,000 mg by mouth every 4 hours as needed for mild pain   amLODIPine (NORVASC) 10 MG tablet 9/22/2024  Yes Yes   Sig: Take 10 mg by mouth daily   glipiZIDE (GLUCOTROL XL) 2.5 MG 24 hr tablet 9/22/2024 at AM  Yes Yes   Sig: Take 5 mg by mouth daily    guaiFENesin-codeine (ROBITUSSIN AC) 100-10 MG/5ML solution Unknown at PRN  Yes Yes   Sig: Take 5 mLs by mouth nightly as needed for cough   polyethylene glycol (MIRALAX) 17 g packet Unknown at PRN  Yes Yes   Sig: Take 1 packet by mouth daily as needed for constipation   simvastatin (ZOCOR) 20 MG tablet 9/22/2024  Yes Yes   Sig: Take 20 mg by mouth at bedtime   tamsulosin (FLOMAX) 0.4 MG capsule 9/22/2024  Yes Yes   Sig: Take 0.4 mg by mouth daily   triamcinolone (KENALOG) 0.1 % external ointment Unknown at PRN  Yes Yes   Sig: Apply topically 2 times daily as needed for irritation.      Facility-Administered Medications: None      Social History   I have reviewed this patient's social history and updated it with pertinent information if needed.  Social History     Tobacco Use    Smoking status: Never    Smokeless tobacco: Never   Substance Use Topics    Alcohol use: No    Drug use: No     Allergies   Allergies   Allergen Reactions    Ibuprofen Other (See Comments)      Physical Exam   Vital Signs: Temp: 98  F (36.7  C) Temp src: Temporal BP: (!) 137/92 Pulse: 91   Resp: 16 SpO2: 96 %      Weight: 129 lbs 13.62 oz    GENERAL:  Alert, Comfortable, No acute distress. Laying in bed.   PSYCH: pleasant, oriented.  HEENT:  Normocephalic  HEART:  Normal S1, S2 with no murmur, RRR  LUNGS:  Normal Respiratory effort. Clear to auscultation bilaterally with no wheezing, rales or ronchi.  ABDOMEN:  Soft, non-tender, mildly distended/rounded, No peritoneal signs.   EXTREMITIES:  No pitting pedal edema, No cyanosis.   SKIN:  Warm, dry to touch. No rash.  NEUROLOGIC: no focal deficits    Medical Decision Making       MANAGEMENT DISCUSSED with the following over the past 24 hours: patient, ED provider, nursing   NOTE(S)/MEDICAL RECORDS REVIEWED over the past 24 hours: labs, imaging, progress notes       Data     I have personally reviewed the following data over the past 24 hrs:    7.9  \   12.0 (L)   / 272     140 105 38.2 (H) /   131 (H)   4.6 20 (L) 3.68 (H) \     ALT: 14 AST: 19 AP: 78 TBILI: 0.5   ALB: 4.2 TOT PROTEIN: 8.2 LIPASE: N/A     Trop: 38 (H) BNP: 783     INR:  0.98 PTT:  N/A   D-dimer:  1.03 (H) Fibrinogen:  N/A       Imaging results reviewed over the past 24 hrs:   Recent Results (from the past 24 hour(s))   Chest XR,  PA & LAT    Narrative    XR CHEST 2 VIEWS 9/23/2024 10:47 AM    HISTORY: sob    COMPARISON: 10/16/2023       Impression    IMPRESSION: No infiltrate, pleural effusion or pneumothorax. Mild  cardiomegaly.    SOL OH MD         SYSTEM ID:  [id number]

## 2024-09-23 NOTE — PLAN OF CARE
ROOM # 222    Living Situation (if not independent, order SW consult): Home with Daughter  Facility name: N/a  : Kristen (Daughter) [phone]       Luzma (Daughter) [phone]    Activity level at baseline: Independent with assistance to see  Activity level on admit: Ax1 walker and gait belt     Who will be transporting you at discharge: Luzma     Patient brought to floor for admission - upon helping settle patient, nuclear medicine called for ventilation/perfusion test - transferred to test.     Patient registered to observation; given Patient Bill of Rights; given the opportunity to ask questions about observation status and their plan of care.  Patient has been oriented to the observation room, bathroom and call light is in place.    Discussed discharge goals and expectations with patient/family.             Goal Outcome Evaluation:

## 2024-09-24 ENCOUNTER — APPOINTMENT (OUTPATIENT)
Dept: PHYSICAL THERAPY | Facility: CLINIC | Age: 89
End: 2024-09-24
Payer: COMMERCIAL

## 2024-09-24 LAB
ANION GAP SERPL CALCULATED.3IONS-SCNC: 13 MMOL/L (ref 7–15)
BUN SERPL-MCNC: 35.4 MG/DL (ref 8–23)
CALCIUM SERPL-MCNC: 8.9 MG/DL (ref 8.8–10.4)
CHLORIDE SERPL-SCNC: 106 MMOL/L (ref 98–107)
CREAT SERPL-MCNC: 3.43 MG/DL (ref 0.67–1.17)
EGFRCR SERPLBLD CKD-EPI 2021: 16 ML/MIN/1.73M2
GLUCOSE BLDC GLUCOMTR-MCNC: 116 MG/DL (ref 70–99)
GLUCOSE BLDC GLUCOMTR-MCNC: 131 MG/DL (ref 70–99)
GLUCOSE BLDC GLUCOMTR-MCNC: 159 MG/DL (ref 70–99)
GLUCOSE SERPL-MCNC: 127 MG/DL (ref 70–99)
HCO3 SERPL-SCNC: 21 MMOL/L (ref 22–29)
HGB BLD-MCNC: 10 G/DL (ref 13.3–17.7)
HGB BLD-MCNC: 9.5 G/DL (ref 13.3–17.7)
HGB BLD-MCNC: 9.9 G/DL (ref 13.3–17.7)
POTASSIUM SERPL-SCNC: 4.6 MMOL/L (ref 3.4–5.3)
SODIUM SERPL-SCNC: 140 MMOL/L (ref 135–145)

## 2024-09-24 PROCEDURE — 99232 SBSQ HOSP IP/OBS MODERATE 35: CPT | Performed by: PHYSICIAN ASSISTANT

## 2024-09-24 PROCEDURE — 99205 OFFICE O/P NEW HI 60 MIN: CPT | Performed by: NURSE PRACTITIONER

## 2024-09-24 PROCEDURE — 97530 THERAPEUTIC ACTIVITIES: CPT | Mod: GP | Performed by: PHYSICAL THERAPIST

## 2024-09-24 PROCEDURE — 82962 GLUCOSE BLOOD TEST: CPT

## 2024-09-24 PROCEDURE — 80048 BASIC METABOLIC PNL TOTAL CA: CPT

## 2024-09-24 PROCEDURE — 97161 PT EVAL LOW COMPLEX 20 MIN: CPT | Mod: GP | Performed by: PHYSICAL THERAPIST

## 2024-09-24 PROCEDURE — 85018 HEMOGLOBIN: CPT

## 2024-09-24 PROCEDURE — 36415 COLL VENOUS BLD VENIPUNCTURE: CPT | Performed by: PHYSICIAN ASSISTANT

## 2024-09-24 PROCEDURE — G0378 HOSPITAL OBSERVATION PER HR: HCPCS

## 2024-09-24 PROCEDURE — 250N000013 HC RX MED GY IP 250 OP 250 PS 637

## 2024-09-24 PROCEDURE — 36415 COLL VENOUS BLD VENIPUNCTURE: CPT

## 2024-09-24 PROCEDURE — 85018 HEMOGLOBIN: CPT | Performed by: PHYSICIAN ASSISTANT

## 2024-09-24 RX ADMIN — TAMSULOSIN HYDROCHLORIDE 0.4 MG: 0.4 CAPSULE ORAL at 07:56

## 2024-09-24 RX ADMIN — AMLODIPINE BESYLATE 10 MG: 10 TABLET ORAL at 07:56

## 2024-09-24 ASSESSMENT — ACTIVITIES OF DAILY LIVING (ADL)
ADLS_ACUITY_SCORE: 46
ADLS_ACUITY_SCORE: 45
ADLS_ACUITY_SCORE: 45
ADLS_ACUITY_SCORE: 46
ADLS_ACUITY_SCORE: 45
ADLS_ACUITY_SCORE: 46
ADLS_ACUITY_SCORE: 45
ADLS_ACUITY_SCORE: 46
ADLS_ACUITY_SCORE: 45
ADLS_ACUITY_SCORE: 46
ADLS_ACUITY_SCORE: 45
ADLS_ACUITY_SCORE: 46
ADLS_ACUITY_SCORE: 46

## 2024-09-24 NOTE — PROGRESS NOTES
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     Rectal Bleeding  Acute Blood Loss Anemia  Suspected Colon Cancer  Pt presented with rectal bleeding.  No prior colonoscopy.  CT revealed a possible circumferential sigmoid colon lesion compatible with malignancy.  Hgb on admission 12.0 down to 9.5 today     - GI consulted and patient and family have declined colonoscopy and noted that they would not want to pursue treatment, but would like blood transfusion of needed  - continue serial hgb checks   - Palliative Care consult  - SW consult    Choledocholithiasis  Cholelithiasis  Incidental finding on imaging.  LFTs are normal and the patient denies any abdominal pain.  He is not interested in pursuing an ERCP.   No signs of cholangitis  - GI following     Shortness of breath  Chest pressure   Elevated D-dimer  Ventilation defect on VQ scan  Patient is resting comfortably, no increased work of breathing. Chest x-ray shows no infiltrate, effusion or pneumothorax, mild cardiomegaly. VQ scan was performed due to shortness of breath and elevated dimer which did not suggest any acute PE however it did show a moderate size wedge-shaped ventilation defect involving the anterior superior left upper lobe which may represent sequelae of chronic PE however exact etiology remains indeterminate per radiology read. No recent immobilization, long travel.   - CT chest negative for mass or LAD  - patient asymptomatic today  - will hold on starting any anticoagulation with no obvious acute PE and active GIB     CKD stage IV  Established care with nephrology outpatient in June 2024 due to increase in creatinine to 3.50. Suspected due  "to diabetes and HTN. Per nephrology note in June unclear etiology for worsening of kidney function. Renal US was ordered showed atrophic, echogenic appearance consistent with chronic medical renal disease, small cortical renal cyst, no renal mass or hydronephrosis.    - appears close to new baseline around 3.50     Elevated troponin  Troponin initially 44 followed by 38.  Was also chronically elevated last year during admission at 36 and 32.  Suspect elevation due to CKD. EKG shows sinus tachycardia. Lower suspicion for acute ACS.   - no further work up, discontinue telemetry     Type II DM  - hold glipizide with poor oral intake     HTN  HLD  - resume statin at discharge if appropriate with goals of care  - continue pta amlodipine with hold parameters     BPH  - continue flomax     Blindness in both eyes      CODE: DNR/DNI  DVT: scd  Diet/fluids: regular diet      Karo Mendez MS, PA-C  Hospitalist Physician Assistant  [facility]      Subjective & Interval Hx:    Patient seen with several family members at the bedside who act as the [place] interpretor.  Patient is blind at baseline.  He currently denies abdominal pain, nausea or emesis.  Family shows me a picture of bloody clots he passed in the toilet.  They are clear they do not want any further procedures or colonoscopy performed.  They met with GI and are interested in talking to Palliative Care and Social Work    Last 24 hr care team notes reviewed.   ROS:  4 point ROS including Respiratory, CV, GI and , other than that noted in the HPI, is negative.    Physical Exam:    Blood pressure 118/76, pulse 92, temperature 97.9  F (36.6  C), temperature source Oral, resp. rate 14, height 1.575 m (5' 2\"), weight 58.9 kg (129 lb 13.6 oz), SpO2 95%.  General: Alert, interactive, NAD, sitting up in a chair  HEENT: AT/NC  Resp: clear to auscultation bilaterally, no crackles or wheezes  Cardiac: regular rate and rhythm, no murmur  Abdomen: Soft, " nontender, nondistended. +BS.  Extremities: No LE edema  Skin: Warm and dry  Neuro: Alert & follows commands and answers simple questions, moves all extremities equally    Labs & Images:  Reviewed in Epic   Medications:    Current Facility-Administered Medications   Medication Dose Route Frequency Provider Last Rate Last Admin    acetaminophen (TYLENOL) tablet 650 mg  650 mg Oral Q4H PRN Sofya Caceres PA-C        Or    acetaminophen (TYLENOL) Suppository 650 mg  650 mg Rectal Q4H PRN Sofya Caceres PA-C        amLODIPine (NORVASC) tablet 10 mg  10 mg Oral Daily Sofya Caceres PA-C   10 mg at 09/24/24 0756    glucose gel 15-30 g  15-30 g Oral Q15 Min PRN Sofya Caceres PA-C        Or    dextrose 50 % injection 25-50 mL  25-50 mL Intravenous Q15 Min PRN Sofya Caceres PA-C        Or    glucagon injection 1 mg  1 mg Subcutaneous Q15 Min PRN Sofya Caceres PA-C        [Held by provider] glipiZIDE (GLUCOTROL XL) 24 hr tablet 5 mg  5 mg Oral Daily Sofya Caceres PA-C        [Held by provider] lactated ringers infusion   Intravenous Continuous Sofya Caceres PA-C   Stopped at 09/23/24 2209    lidocaine (LMX4) cream   Topical Q1H PRN Sofya Caceres PA-C        lidocaine 1 % 0.1-1 mL  0.1-1 mL Other Q1H PRN Sofya Caceres PA-C        ondansetron (ZOFRAN ODT) ODT tab 4 mg  4 mg Oral Q6H PRN Sofya Caceres PA-C        Or    ondansetron (ZOFRAN) injection 4 mg  4 mg Intravenous Q6H PRN Sofya Caceres PA-C        prochlorperazine (COMPAZINE) injection 5 mg  5 mg Intravenous Q6H PRN Sofya Caceres PA-C        Or    prochlorperazine (COMPAZINE) tablet 5 mg  5 mg Oral Q6H PRN Sofya Caceres PA-C        Or    prochlorperazine (COMPAZINE) suppository 12.5 mg  12.5 mg Rectal Q12H PRN Sofya Caceres PA-C        senna-docusate (SENOKOT-S/PERICOLACE) 8.6-50 MG per tablet 1 tablet  1 tablet Oral BID PRN Caceres, Sofya, PA-C        Or    senna-docusate (SENOKOT-S/PERICOLACE) 8.6-50 MG per tablet 2 tablet  2 tablet Oral BID  PRN Sofya Caceres PA-C        sodium chloride (PF) 0.9% PF flush 3 mL  3 mL Intracatheter q1 min prn Sofya Caceres PA-C        sodium chloride (PF) 0.9% PF flush 3 mL  3 mL Intracatheter Q8H Sofya Caceres PA-C   3 mL at 09/23/24 7476    tamsulosin (FLOMAX) capsule 0.4 mg  0.4 mg Oral Daily Sofya Caceres PA-C   0.4 mg at 09/24/24 4413

## 2024-09-24 NOTE — CONSULTS
Palliative Care Consultation Note  [facility]      Patient: Jessica Linda  Date of Admission:  9/23/2024    Requesting Clinician / Team: hospitalist  Reason for consult: Goals of care  Decisional support  Patient and family support       Recommendations & Counseling     GOALS OF CARE:   Restorative with limits   They do not want colonoscopy, they would not want cancer targeted therapies if it was cancer  They would like to have the opportunity to continue to get blood transfusions as needed  We discussed going home on hospice and they are mostly on board with this but are worried about the blood transfusions  Plan is to allow them to talk about things tonight and palliative will see again at 10 am to discuss next steps.    ADVANCE CARE PLANNING:  No health care directive on file. Per system policy, Surrogate Decision-makers for Patients With Diminished Decision-making Capacity offers guidance on possible decision-makers. Kristen (daughter) has been identified as a surrogate decision maker.   There is no POLST form on file, defer to patient and/or next of kin for decisions   Code status: No CPR- Do NOT Intubate    MEDICAL MANAGEMENT:   #General Weakness  Appreciate the staff for assistance with ADLs    PSYCHOSOCIAL/SPIRITUAL SUPPORT:  Family has 7 adult children    Palliative Care will continue to follow. Thank you for the consult and allowing us to aid in the care of Jessica Linda.    These recommendations have been discussed with medical team.    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      Today, the patient was seen for:  Goals of care    History of Present Illness   Met  with two daughters at the bedside..   I introduced our role as an extra layer of support and how we help patients and families dealing with serious, potentially life-limiting illnesses. I explained the composition of the palliative care team.  Palliative care helps patients and families navigate their care while focusing on the whole person; providing emotional, social and spiritual support  Palliative care often assists with symptom management, information sharing about what to expect from the illness, available treatment options and what effect those options may have on the disease course, and provide effective communication and caring support.    Prognosis, Goals, & Planning:   Functional Status just prior to this current hospitalization:  Increased weakness at times    Prognosis, Goals, and/or Advance Care Planning:  Discussed what continuing restorative/life-prolonging care entails, including continued (re)admissions to the hospital, continuing with preventative and primary care, seeing disease/organ specific specialty consultations for medical treatments in hopes to prolong life for as long as possible.    Education provided regarding hospice philosophy, prognostic,and eligibility criteria. Discussed what services are provided and those that are not,  Discussed common misconceptions. We explored the various disposition options where they can receive hospice care (home, residential hospice homes, LTC with hospice) including subsequent financial and familial implications. Discussed typical anticipated timing of discharge.    Code Status was addressed today:   yes    Patient has decision-making capacity today for complex decisions: Questionable          Coping, Meaning, & Spirituality:   Mood, coping, and/or meaning in the context of serious illness were addressed today: Yes    Social:   Living situation:lives with family    Medications:  Reviewed this patient's medication profile and medications from this  hospitalization. Notable medications: none   [place] Board of Pharmacy Data Base Reviewed: Yes:   reviewed - controlled substances reflected in medication list.    ROS:  Comprehensive ROS is reviewed and is negative except as here & per HPI:     Physical Exam   Vital Signs with Ranges  Temp:  [97.8  F (36.6  C)-98.1  F (36.7  C)] 97.9  F (36.6  C)  Pulse:  [90-98] 92  Resp:  [14-18] 14  BP: (118-149)/(76-91) 118/76  SpO2:  [95 %-98 %] 95 %  Wt Readings from Last 10 Encounters:   09/23/24 58.9 kg (129 lb 13.6 oz)   10/16/23 60 kg (132 lb 4.4 oz)   11/27/12 57.2 kg (126 lb)   12/19/07 66.2 kg (146 lb)     129 lbs 13.62 oz    PHYSICAL EXAM:  Constitutional: alert and no distress   Cardiovascular: negative  Respiratory: negative  Psychiatric: mentation appears normal and affect normal/bright  Abdomen: Abdomen soft, non-tender. BS normal. No masses, organomegaly    Data reviewed:  Results for orders placed or performed during the hospital encounter of 09/23/24 (from the past 24 hour(s))   Glucose by meter   Result Value Ref Range    GLUCOSE BY METER POCT 128 (H) 70 - 99 mg/dL   CT Chest Abdomen Pelvis w/o Contrast    Narrative    EXAM: CT CHEST ABDOMEN PELVIS W/O CONTRAST  LOCATION: [facility]  DATE: 9/23/2024    INDICATION: Rectal bleeding, abdominal bloating, shortness of breath.  COMPARISON: None.  TECHNIQUE: CT scan of the chest, abdomen, and pelvis was performed without IV contrast. Multiplanar reformats were obtained. Dose reduction techniques were used.   CONTRAST: None.    FINDINGS:   LUNGS AND PLEURA: No masses or effusions. Trace atelectasis.    MEDIASTINUM/AXILLAE: No adenopathy demonstrated in the absence of contrast. No aneurysm. Small fat-containing hiatal hernia.    CORONARY ARTERY CALCIFICATION: Moderate.    HEPATOBILIARY: Normal contour with no significant mass. Calcified gallstones. Multiple stones in the common bile duct compatible with choledocholithiasis. No biliary  dilatation.    PANCREAS: No significant mass, duct dilatation, or inflammatory change.    SPLEEN: Normal size.    ADRENAL GLANDS: No significant nodules.    KIDNEYS/BLADDER: No significant mass, stone, or hydronephrosis.    BOWEL: No inflammation. Question sigmoid colon lesion centered on image 258 series 3. No obstruction.    LYMPH NODES: No adenopathy demonstrated in the absence of contrast.    VASCULATURE: There are moderate atherosclerotic changes of the visualized aorta and its branches. There is no evidence of aortic aneurysm.    PELVIC ORGANS: No pelvic masses.    MUSCULOSKELETAL: No frankly destructive bony lesions.      Impression    IMPRESSION:  1.  Possible circumferential sigmoid colon lesion compatible with malignancy. Direct visualization recommended.  2.  Cholelithiasis and choledocholithiasis without biliary dilatation.   Hemoglobin   Result Value Ref Range    Hemoglobin 10.2 (L) 13.3 - 17.7 g/dL   Basic metabolic panel   Result Value Ref Range    Sodium 140 135 - 145 mmol/L    Potassium 4.6 3.4 - 5.3 mmol/L    Chloride 106 98 - 107 mmol/L    Carbon Dioxide (CO2) 21 (L) 22 - 29 mmol/L    Anion Gap 13 7 - 15 mmol/L    Urea Nitrogen 35.4 (H) 8.0 - 23.0 mg/dL    Creatinine 3.43 (H) 0.67 - 1.17 mg/dL    GFR Estimate 16 (L) >60 mL/min/1.73m2    Calcium 8.9 8.8 - 10.4 mg/dL    Glucose 127 (H) 70 - 99 mg/dL   Hemoglobin   Result Value Ref Range    Hemoglobin 9.5 (L) 13.3 - 17.7 g/dL   Glucose by meter   Result Value Ref Range    GLUCOSE BY METER POCT 116 (H) 70 - 99 mg/dL   Glucose by meter   Result Value Ref Range    GLUCOSE BY METER POCT 131 (H) 70 - 99 mg/dL   Hemoglobin   Result Value Ref Range    Hemoglobin 10.0 (L) 13.3 - 17.7 g/dL       Medical Decision Making       70 MINUTES SPENT BY ME on the date of service doing chart review, history, exam, documentation & further activities per the note.

## 2024-09-24 NOTE — PLAN OF CARE
PRIMARY DIAGNOSIS: GI BLEED    OUTPATIENT/OBSERVATION GOALS TO BE MET BEFORE DISCHARGE  Orthostatic performed: No    Stable Hgb Yes.   Recent Labs   Lab Test 09/24/24  0546 09/23/24  1800 09/23/24  1004   HGB 9.5* 10.2* 12.0*       Resolved or declined bleeding episodes: Yes Last episode: 9/24/24 at approx 1130    Appropriate testing complete: Yes    Cleared for discharge by consultants (if involved): No    Safe discharge environment identified: Yes    Discharge Planner Nurse   Safe discharge environment identified: Yes  Barriers to discharge: Yes, palliative consult pending.        Entered by: Celina Melton RN 09/24/2024 1:11 PM     Please review provider order for any additional goals.   Nurse to notify provider when observation goals have been met and patient is ready for discharge.    Goal Outcome Evaluation:      Plan of Care Reviewed With: patient, family    Overall Patient Progress: no changeOverall Patient Progress: no change    Outcome Evaluation: vss afebrile. another formed stool with blood and clots this afternoon. family at bedside.

## 2024-09-24 NOTE — PLAN OF CARE
Per family request to have pt diet to clear liquid, pt was NPO for colonoscopy, family refused to have the colonoscopy to be done.

## 2024-09-24 NOTE — PLAN OF CARE
Goal Outcome Evaluation:      Plan of Care Reviewed With: patient    Overall Patient Progress: improvingOverall Patient Progress: improving    Outcome Evaluation: AO x4, [place] speaking, daughter in the room translating, bilaterally blindness, denies pain, Tele SR 96, PIV SL, LBM 9/23 no stool only 2x blood clots. Family wants to refuse colonoscopy test tomorrow, notified provider on- call, stopped bowel prep for procedure. Will continue to monitor.    PRIMARY DIAGNOSIS: Hematochezia,    OUTPATIENT/OBSERVATION GOALS TO BE MET BEFORE DISCHARGE  Orthostatic performed: N/A    Stable Hgb Yes.   Recent Labs   Lab Test 09/23/24  1800 09/23/24  1004 11/07/23  0814   HGB 10.2* 12.0* 12.5*       Resolved or declined bleeding episodes: No Last episode: 9/23/24    Appropriate testing complete: No    Cleared for discharge by consultants (if involved): No    Safe discharge environment identified: Yes    Discharge Planner Nurse   Safe discharge environment identified: Yes  Barriers to discharge: Yes       Entered by: Kianna Marr RN 09/24/2024      Please review provider order for any additional goals.   Nurse to notify provider when observation goals have been met and patient is ready for discharge.

## 2024-09-24 NOTE — PROGRESS NOTES
[facility]  OUTPATIENT PHYSICAL THERAPY EVALUATION  PLAN OF TREATMENT FOR OUTPATIENT REHABILITATION  (COMPLETE FOR INITIAL CLAIMS ONLY)  Patient's Last Name, First Name, M.I.  YOB: 1932  Jessica Linda                        Provider's Name  [facility] Medical Record No.  [id number]                             Onset Date:  09/23/24   Start of Care Date:  (P) 09/24/24   Type:     _X_PT   ___OT   ___SLP Medical Diagnosis:  (P) rectal bleed              PT Diagnosis:  (P) Impaired functional mobility Visits from SOC:  1     See note for plan of treatment, functional goals and certification details    I CERTIFY THE NEED FOR THESE SERVICES FURNISHED UNDER        THIS PLAN OF TREATMENT AND WHILE UNDER MY CARE     (Physician co-signature of this document indicates review and certification of the therapy plan).              09/24/24 0800   Appointment Info   Signing Clinician's Name / Credentials (PT) Xuan Warren, PT   Quick Adds   Quick Adds Certification       Present no  (daughters acting as )   Language Azerbaijani   Living Environment   People in Home child(terrence), adult   Current Living Arrangements house   Home Accessibility stairs to enter home;stairs within home   Number of Stairs, Main Entrance 2   Stair Railings, Main Entrance none   Number of Stairs, Within Home, Primary greater than 10 stairs   Stair Railings, Within Home, Primary railing on left side (ascending)   Transportation Anticipated family or friend will provide   Living Environment Comments Pt lives with his daughter and son in law in a multi level home with 2 RONIT without rails.  Bedroom on main level.  Pt ambulates down a flight of 16 stairs with L rail ascending, to take a shower.  (bathtub shower).  Other daughter lives nearby.   Self-Care   Usual Activity Tolerance moderate   Current Activity Tolerance fair   Equipment Currently Used at Home  shower chair;walker, rolling   Fall history within last six months no   Activity/Exercise/Self-Care Comment Pt was receiving assist with all ADLs from family and PCA.  Pt has 24/7 support from family.  At baseline pt ambulates without a device, running his hands along the wall and furniture to feel his way.  Pt has assist to ambulate the 16 steps to the tub/shower in the basement.  Daughter assists with showering using a shower chair.  Family pushes pt outside while sitting on his 4WW (has footrests).   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  Hgb on 9/24 9.2   Existing Precautions/Restrictions fall   General Observations Pt lying in bed with daughters present   Cognition   Follows Commands (Cognition) follows one-step commands;50-74% accuracy   Cognitive Status Comments oriented to being in hospital, day of week and year.  Pt legally blind.  vitals following gait: /83, SpO2 98%, .   Pain Assessment   Patient Currently in Pain No   Integumentary/Edema   Integumentary/Edema Comments age related skin changes   Posture    Posture Forward head position;Protracted shoulders   Range of Motion (ROM)   ROM Comment tight hamstrings with B SLR to ~ 60 deg   Strength (Manual Muscle Testing)   Strength (Manual Muscle Testing) Deficits observed during functional mobility   Strength Comments generalized mms weakness and deconditioning.   Bed Mobility   Comment, (Bed Mobility) sup > sit Min A   Transfers   Comment, (Transfers) sit > stand CGA   Gait/Stairs (Locomotion)   Comment,  (Gait/Stairs) Pt amb50' with FWW and CGA with A to steer FWW   Balance   Balance Comments Impaired dynamic standing balance requiring B UE support on FWW and CGA at trunk for safe mobility   Sensory Examination   Sensory Perception patient reports no sensory changes   Clinical Impression   Criteria for Skilled Therapeutic Intervention Yes, treatment indicated   PT Diagnosis (PT) Impaired functional mobility   Influenced by the following impairments decreased strength and balance, blind, low hgb, decreased activity tolerance   Functional limitations due to impairments impaired gait, increased falls risk, unable to amb longer household or community distances, assisted mobility and ADLs due to deconditioning and blindness   Clinical Presentation (PT Evaluation Complexity) evolving   Clinical Presentation Rationale decreasing hgb due to rectal bleed, multiple comorbidities   Clinical Decision Making (Complexity) low complexity   Planned Therapy Interventions (PT) balance training;bed mobility training;gait training;home exercise program;patient/family education;ROM (range of motion);stair training;strengthening;transfer training;progressive activity/exercise;risk factor education;home program guidelines;stretching   Risk & Benefits of therapy have been explained evaluation/treatment results reviewed;care plan/treatment goals reviewed;risks/benefits reviewed;current/potential barriers reviewed;participants voiced agreement with care plan;participants included;patient;daughter   PT Total Evaluation Time   PT Eval, Low Complexity Minutes (15920) 10   Therapy Certification   Start of care date 09/24/24   Certification date from 09/24/24   Certification date to 09/25/24   Medical Diagnosis rectal bleed   Physical Therapy Goals   PT Frequency Daily   PT Predicted Duration/Target Date for Goal Attainment 09/25/24   PT Goals Bed Mobility;Transfers;Gait;Stairs   PT: Bed Mobility Supervision/stand-by assist;Supine to/from sit    PT: Transfers Supervision/stand-by assist;Sit to/from stand;Assistive device   PT: Gait Minimal assist;Rolling walker;150 feet  (assist to steer 4WW)   PT: Stairs Minimal assist;2 stairs  (HHA)   PT Discharge Planning   PT Plan bring small 4WW and progress gait distance, trial 2 steps with HHA(no rails), seated LE exer   PT Discharge Recommendation (DC Rec) home with assist;home with home care physical therapy   PT Rationale for DC Rec Pt below baseline for mobility.  Pt lives with his daughter in a house with 2 RONIT and 16 steps down to full bath.  At baseline pt ambulates without a device, running his hands along the walls/furniture to feel his way, due to being blind.  Pt currently requiring Min A with all mobility, using a FWW to amb 100'.  Recommend pt return home with continued 24/7 support from family and PCA with assist for all ADLs and IADLs.  Recommend that pt use his 4WW with assist to steer.  Recommend HHPT to increase strength, activity tolerance and progress gait to enable pt to negotiate stairs with family assist, for showering.  Pt would benefit from getting a commode to use over the toilet on the main level to increase height and provide armrests.   PT Brief overview of current status Min A bed mobility, CGA transfers, amb with FWW and CGA with assist to steer, encourage up to chair 3x/day   PT Equipment Needed at Discharge commode chair

## 2024-09-24 NOTE — PLAN OF CARE
Goal Outcome Evaluation:      Plan of Care Reviewed With: patient    Overall Patient Progress: improvingOverall Patient Progress: improving    Outcome Evaluation: AO x4, Yoruba speaking, daughter in the room translating, bilaterally blindness, LBM 9/23 no stool only blood clots, denies pain, Tele SR 88. PIV SL, Plan: is pt to drink the glycol suspension in prep for colonscopy. NPO after midnight. Will continue to monitor.    PRIMARY DIAGNOSIS: Hematochezia,    OUTPATIENT/OBSERVATION GOALS TO BE MET BEFORE DISCHARGE  Orthostatic performed: N/A    Stable Hgb Yes.   Recent Labs   Lab Test 09/23/24  1800 09/23/24  1004 11/07/23  0814   HGB 10.2* 12.0* 12.5*       Resolved or declined bleeding episodes: No Last episode: 9/23/24    Appropriate testing complete: No    Cleared for discharge by consultants (if involved): No    Safe discharge environment identified: Yes    Discharge Planner Nurse   Safe discharge environment identified: Yes  Barriers to discharge: Yes       Entered by: Kianna Marr RN 09/24/2024      Please review provider order for any additional goals.   Nurse to notify provider when observation goals have been met and patient is ready for discharge.

## 2024-09-24 NOTE — PLAN OF CARE
"CARE FROM: 1500 - 1900  PRIMARY DIAGNOSIS: Hematochezia/SOB  OUTPATIENT/OBSERVATION GOALS TO BE MET BEFORE DISCHARGE:  ADLs back to baseline: No    Activity and level of assistance: Assist x 1 W/G    Pain status: Pain free.    Return to near baseline physical activity: No     Discharge Planner Nurse   Safe discharge environment identified: Yes  Barriers to discharge: Yes       Entered by: Brittany Pineda RN 09/23/2024 7:57 PM  Patient A & O x 4, Spanish speaking, daughter in the room translating. Patient is bilaterally blind. Lung Sounds CTA, BS active. LBM 9/23, continent of bladder. Denies pain/SOB. Tele reading; SR 87. PIV SL, Plan is pt to drink the glycol suspension in preparation for tomorrow's procedure. NPO after midnight for colonoscopy tomorrow. Patient and family advised to encourage patient to try and finish the suspension by midnight. Family showed understanding. Will continue to monitor.  BP (!) 152/96 (BP Location: Right arm, Patient Position: Semi-Elliott's, Cuff Size: Adult Small)   Pulse 101   Temp 98.3  F (36.8  C) (Oral)   Resp 22   Ht 1.575 m (5' 2\")   Wt 58.9 kg (129 lb 13.6 oz)   SpO2 97%   BMI 23.75 kg/m       Please review provider order for any additional goals.   Nurse to notify provider when observation goals have been met and patient is ready for discharge.Problem: Adult Inpatient Plan of Care  Goal: Plan of Care Review  Description: The Plan of Care Review/Shift note should be completed every shift.  The Outcome Evaluation is a brief statement about your assessment that the patient is improving, declining, or no change.  This information will be displayed automatically on your shift  note.  Outcome: Progressing  Goal: Patient-Specific Goal (Individualized)  Description: You can add care plan individualizations to a care plan. Examples of Individualization might be:  \"Parent requests to be called daily at 9am for status\", \"I have a hard time hearing out of my right ear\", or \"Do " "not touch me to wake me up as it startles  me\".  Outcome: Progressing  Goal: Absence of Hospital-Acquired Illness or Injury  Outcome: Progressing  Intervention: Identify and Manage Fall Risk  Recent Flowsheet Documentation  Taken 9/23/2024 1819 by Brittany Pineda RN  Safety Promotion/Fall Prevention:   activity supervised   clutter free environment maintained   lighting adjusted   mobility aid in reach   nonskid shoes/slippers when out of bed  Intervention: Prevent Skin Injury  Recent Flowsheet Documentation  Taken 9/23/2024 1819 by Brittany Pineda RN  Body Position: position changed independently  Intervention: Prevent and Manage VTE (Venous Thromboembolism) Risk  Recent Flowsheet Documentation  Taken 9/23/2024 1819 by Brittany Pineda RN  VTE Prevention/Management: SCDs off (sequential compression devices)  Intervention: Prevent Infection  Recent Flowsheet Documentation  Taken 9/23/2024 1819 by Brittany Pineda RN  Infection Prevention:   cohorting utilized   hand hygiene promoted   rest/sleep promoted   single patient room provided  Goal: Optimal Comfort and Wellbeing  Outcome: Progressing  Goal: Readiness for Transition of Care  Outcome: Progressing  "

## 2024-09-24 NOTE — PLAN OF CARE
PRIMARY DIAGNOSIS: GI BLEED    OUTPATIENT/OBSERVATION GOALS TO BE MET BEFORE DISCHARGE  Orthostatic performed: No    Stable Hgb Yes.   Recent Labs   Lab Test 09/24/24  0546 09/23/24  1800 09/23/24  1004   HGB 9.5* 10.2* 12.0*       Resolved or declined bleeding episodes: Yes Last episode: 9/24/24, formed with blood and clots.     Appropriate testing complete: Yes    Cleared for discharge by consultants (if involved): No    Safe discharge environment identified: No    Discharge Planner Nurse   Safe discharge environment identified: Yes  Barriers to discharge: Yes, GI following.        Entered by: Celina Melton RN 09/24/2024 9:43 AM     Please review provider order for any additional goals.   Nurse to notify provider when observation goals have been met and patient is ready for discharge.    Goal Outcome Evaluation:      Plan of Care Reviewed With: patient, family    Overall Patient Progress: no changeOverall Patient Progress: no change    Outcome Evaluation: vss afebrile. 1 formed stool with blood and clots in it. tolerating clear liq diet. no pain. pt blind. tele sr. Pt  speaks [place]an, family intrepreting and involved in cares.      Problem: Adult Inpatient Plan of Care  Goal: Plan of Care Review  Description: The Plan of Care Review/Shift note should be completed every shift.  The Outcome Evaluation is a brief statement about your assessment that the patient is improving, declining, or no change.  This information will be displayed automatically on your shift  note.  Outcome: Progressing  Flowsheets (Taken 9/24/2024 0800)  Outcome Evaluation: vss afebrile. 1 formed stool with blood and clots in it. tolerating clear liq diet. no pain. pt blind. tele sr. Pt  speaks [place], family intrepreting and involved in cares.  Plan of Care Reviewed With:   patient   family  Overall Patient Progress: no change  Goal: Patient-Specific Goal (Individualized)  Description: You can add care plan individualizations to  "a care plan. Examples of Individualization might be:  \"Parent requests to be called daily at 9am for status\", \"I have a hard time hearing out of my right ear\", or \"Do not touch me to wake me up as it startles  me\".  Outcome: Progressing  Goal: Absence of Hospital-Acquired Illness or Injury  Outcome: Progressing  Intervention: Identify and Manage Fall Risk  Recent Flowsheet Documentation  Taken 9/24/2024 0800 by Celina Chin RN  Safety Promotion/Fall Prevention:   activity supervised   clutter free environment maintained   lighting adjusted   mobility aid in reach   nonskid shoes/slippers when out of bed  Intervention: Prevent Skin Injury  Recent Flowsheet Documentation  Taken 9/24/2024 0800 by Celina Chin RN  Body Position: position changed independently  Intervention: Prevent Infection  Recent Flowsheet Documentation  Taken 9/24/2024 0800 by Celina Chin RN  Infection Prevention:   cohorting utilized   hand hygiene promoted   rest/sleep promoted   single patient room provided  Goal: Optimal Comfort and Wellbeing  Outcome: Progressing  Goal: Readiness for Transition of Care  Outcome: Progressing         "

## 2024-09-24 NOTE — PLAN OF CARE
Goal Outcome Evaluation:      Plan of Care Reviewed With: patient    Overall Patient Progress: improvingOverall Patient Progress: improving    Outcome Evaluation: AO x4, [place]an speaking, daughter in the room translating, bilaterally blindness, denies pain, Tele SR 96, PIV SL, LBM 9/23 no stool only blood clots 2x. Family wants to refuse colonoscopy test tomorrow, notified provider on- call, stopped bowel prep for procedure. Will continue to monitor.    PRIMARY DIAGNOSIS: Hematochezia,    OUTPATIENT/OBSERVATION GOALS TO BE MET BEFORE DISCHARGE  Orthostatic performed: N/A    Stable Hgb Yes.   Recent Labs   Lab Test 09/23/24  1800 09/23/24  1004 11/07/23  0814   HGB 10.2* 12.0* 12.5*       Resolved or declined bleeding episodes: No Last episode: 9/23/24    Appropriate testing complete: No    Cleared for discharge by consultants (if involved): No    Safe discharge environment identified: Yes    Discharge Planner Nurse   Safe discharge environment identified: Yes  Barriers to discharge: Yes       Entered by: Kianna Marr RN 09/24/2024      Please review provider order for any additional goals.   Nurse to notify provider when observation goals have been met and patient is ready for discharge.

## 2024-09-24 NOTE — CONSULTS
GASTROENTEROLOGY CONSULTATION      Jessica Linda  [address] [facility] [address]  89 year old male     Admission Date/Time: 9/23/2024  Primary Care Provider: Wilmer Mathew     We were asked to see the patient in consultation by Dr. Gibbs for evaluation of hematochezia.    CC: hematochezia      HPI:  Jessica Linda is a 89 year old male with history of CKD stage IV, T2DM, HTN, HLD, BPH, history of Streptococcus bovis bacteremia 10/20/2023, depression, blindness who presents with rectal bleeding.  The patient's daughters noticed a small blood clot on the toilet seat yesterday that prompted them to come to the ED.  Overnight he has continued to pass red blood clots.    Hemoglobin initially 12.0 which has progressively dropped to 9.5 this morning.  CT chest/abdomen/pelvis without contrast was done which showed possible circumferential sigmoid colon lesion compatible with malignancy.  Also noted incidental findings of cholelithiasis and choledocholithiasis, no biliary dilation.  Of note, LFTs on arrival were normal.    The patient was being prepped overnight for colonoscopy today, but family decided that they would like to decline this procedure therefore prep was stopped and patient started on a clear liquid diet.     Currently, he is experiencing ongoing rectal bleeding.  Denies any abdominal pain, nausea, vomiting.  His daughters do endorse that he has had a lack of appetite over the past several months.  He has had some constipation but this has been a chronic issue over years and typically well-controlled with MiraLAX.  They do report about 4-5 pounds of weight loss over the past week.    Long discussion with the patient and his daughters at bedside today.  They declined  but the daughters did interpret for patient.  The patient did confirm that he is not interested in a colonoscopy at this point in time.  Should this be cancer, as suspected, he would not want to pursue any treatment and rather allow it to  take its natural course.  We also discussed incidental finding of choledocholithiasis and again he is not interested in ERCP.       PAST MEDICAL HISTORY:  Patient Active Problem List    Diagnosis Date Noted    Hematochezia 09/23/2024     Priority: Medium    Dizziness 09/23/2024     Priority: Medium    Generalized weakness 09/23/2024     Priority: Medium    Dyspnea 09/23/2024     Priority: Medium    Elevated troponin 09/23/2024     Priority: Medium    Elevated d-dimer 09/23/2024     Priority: Medium    Urinary incontinence 10/16/2023     Priority: Medium    Weakness generalized 10/16/2023     Priority: Medium    COVID-19 10/16/2023     Priority: Medium    Depression, major, in remission (H24) 04/09/2013     Priority: Medium    Type 2 diabetes, HbA1C goal < 8% (H) 11/27/2012     Priority: Medium    Dyslipidemia 11/27/2012     Priority: Medium    HTN, goal below 140/90 11/27/2012     Priority: Medium    Insomnia 11/27/2012     Priority: Medium    Blindness of left eye with low vision in contralateral eye 11/27/2012     Priority: Medium    Hyperlipidemia LDL goal <100 11/27/2012     Priority: Medium    Chronic kidney disease, stage III (moderate) (H) 05/08/2009     Priority: Medium     Formatting of this note might be different from the original. Chronic Kidney Disease Stage 3      Proteinuria 05/08/2009     Priority: Medium     Formatting of this note might be different from the original. Microalbuminuria      Arthropathy 10/25/2007     Priority: Medium     Formatting of this note might be different from the original. Arthritis  NOS      Impotence of organic origin 10/23/2006     Priority: Medium     Formatting of this note might be different from the original. Erectile Dysfunction            ROS: A comprehensive ten point review of systems was negative aside from those in mentioned in the HPI.       MEDICATIONS:   Prior to Admission medications    Medication Sig Start Date End Date Taking? Authorizing Provider  "  acetaminophen (TYLENOL) 500 MG tablet Take 500-1,000 mg by mouth every 4 hours as needed for mild pain   Yes Unknown, Entered By History   amLODIPine (NORVASC) 10 MG tablet Take 10 mg by mouth daily   Yes Unknown, Entered By History   glipiZIDE (GLUCOTROL XL) 2.5 MG 24 hr tablet Take 5 mg by mouth daily   Yes Unknown, Entered By History   guaiFENesin-codeine (ROBITUSSIN AC) 100-10 MG/5ML solution Take 5 mLs by mouth nightly as needed for cough   Yes Unknown, Entered By History   LANsoprazole (PREVACID) 30 MG DR capsule Take 30 mg by mouth daily   Yes Unknown, Entered By History   polyethylene glycol (MIRALAX) 17 g packet Take 1 packet by mouth daily as needed for constipation   Yes Unknown, Entered By History   simvastatin (ZOCOR) 20 MG tablet Take 20 mg by mouth at bedtime   Yes Unknown, Entered By History   tamsulosin (FLOMAX) 0.4 MG capsule Take 0.4 mg by mouth daily   Yes Unknown, Entered By History   triamcinolone (KENALOG) 0.1 % external ointment Apply topically 2 times daily as needed for irritation.   Yes Unknown, Entered By History        ALLERGIES:   Allergies   Allergen Reactions    Ibuprofen Other (See Comments)        SOCIAL HISTORY:  Social History     Tobacco Use    Smoking status: Never    Smokeless tobacco: Never   Substance Use Topics    Alcohol use: No    Drug use: No        FAMILY HISTORY:  Family History   Problem Relation Age of Onset    Hypertension Father         PHYSICAL EXAM:   /83 (BP Location: Right arm)   Pulse 90   Temp 97.8  F (36.6  C) (Oral)   Resp 16   Ht 1.575 m (5' 2\")   Wt 58.9 kg (129 lb 13.6 oz)   SpO2 96%   BMI 23.75 kg/m       PHYSICAL EXAM:  General: alert, oriented, NAD  SKIN: no suspicious lesions, rashes, jaundice, or spider angiomas  HEAD: Normocephalic. No masses, lesions, tenderness or abnormalities  NECK: Neck supple. No adenopathy. Thyroid symmetric, normal size.  EYES: No scleral icterus  ENT: ENT exam normal, no neck nodes or sinus " tenderness  RESPIRATORY: negative, Good diaphragmatic excursion. Lungs clear  CARDIOVASCULAR: negative, PMI normal. No lifts, heaves, or thrills. RRR. No murmurs, clicks gallops or rub  GASTROINTESTINAL: +BS, soft, NT, ND, no HSM, no masses/guarding/rebound  JOINT/EXTREMITIES: extremities normal- no gross deformities noted, gait normal and normal muscle tone  NEURO: Reflexes grossly normal and symmetric. Sensation grossly WNL.  PSYCH: no abnormal anxiety/depression  LYMPH: No anterior cervical, posterior cervical, or supraclavicular adenopathy     LABS:  I reviewed the patient's new clinical lab test results.   Recent Labs   Lab Test 09/24/24  0546 09/23/24  1800 09/23/24  1005 09/23/24  1004 11/07/23  0814 10/25/23  1447   WBC  --   --   --  7.9 8.1 9.6   HGB 9.5* 10.2*  --  12.0* 12.5* 12.0*   MCV  --   --   --  89 93 92   PLT  --   --   --  272 235 287   INR  --   --  0.98  --   --   --      Recent Labs   Lab Test 09/24/24  0546 09/23/24  1004 10/18/23  0659    140 139   POTASSIUM 4.6 4.6 5.0   CHLORIDE 106 105 108*   CO2 21* 20* 20*   BUN 35.4* 38.2* 42.7*   ANIONGAP 13 15 11   STANLEY 8.9 9.4 8.6     Recent Labs   Lab Test 09/23/24  1004 11/07/23  0814 10/16/23  0828   ALBUMIN 4.2  --  3.9   BILITOTAL 0.5  --  0.4   ALT 14 259* 26   AST 19 63* 29   ALKPHOS 78  --  79       IMAGING  I personally reviewed the patient's new imaging results.    CT C/A/P with IV contrast 9/23/2024  IMPRESSION:  1.  Possible circumferential sigmoid colon lesion compatible with malignancy. Direct visualization recommended.  2.  Cholelithiasis and choledocholithiasis without biliary dilatation.     CONSULTATION ASSESSMENT AND PLAN:      This patient is a 89 year old male with history of CKD stage IV, T2DM, HTN, HLD, BPH, history of Streptococcus bovis bacteremia 10/20/2023, depression, blindness who presents with rectal bleeding and recent anorexia.  CT scan revealed a lesion of the sigmoid colon most compatible with malignancy.   Incidental findings were noted of cholelithiasis and choledocholithiasis without bile duct dilation.  LFTs normal.    1.  Suspected colon cancer.  Ongoing hematochezia with sigmoid lesion noted on imaging.  This is very likely malignant.  The patient and family have declined colonoscopy and noted that they would not want to pursue treatment.  After discussion, they are interested in meeting with palliative care to discuss how to best focus on the patient's comfort and establish a safe plan for going home.    2.  Choledocholithiasis.  Incidental finding on imaging.  LFTs are normal and the patient denies any abdominal pain.  He is not interested in pursuing an ERCP.    Plan  --Monitor bleeding, hgb. Transfuse per primary.   --Recommend Palliative Care Consultation.     Discussed with Betty Linares and Barb, GI staff. Discussed with XAVIER Alejandre.    We will sign off at this time but please do not hesitate to reach out should any questions or concerns arise.     Total time spent:  60 minutes was spent providing patient care, including patient evaluation, reviewing documentation/test results, and . Thank you for asking us to participate in the care of this patient.      Pearl Espinosa CNP  [facility] ([facility])  [phone]

## 2024-09-25 VITALS
SYSTOLIC BLOOD PRESSURE: 130 MMHG | HEART RATE: 118 BPM | WEIGHT: 129.85 LBS | HEIGHT: 62 IN | DIASTOLIC BLOOD PRESSURE: 85 MMHG | TEMPERATURE: 98.4 F | RESPIRATION RATE: 16 BRPM | OXYGEN SATURATION: 96 % | BODY MASS INDEX: 23.9 KG/M2

## 2024-09-25 LAB — HGB BLD-MCNC: 9.9 G/DL (ref 13.3–17.7)

## 2024-09-25 PROCEDURE — 99417 PROLNG OP E/M EACH 15 MIN: CPT | Performed by: NURSE PRACTITIONER

## 2024-09-25 PROCEDURE — 85018 HEMOGLOBIN: CPT | Performed by: PHYSICIAN ASSISTANT

## 2024-09-25 PROCEDURE — G0378 HOSPITAL OBSERVATION PER HR: HCPCS

## 2024-09-25 PROCEDURE — 99497 ADVNCD CARE PLAN 30 MIN: CPT | Mod: 25 | Performed by: NURSE PRACTITIONER

## 2024-09-25 PROCEDURE — 250N000013 HC RX MED GY IP 250 OP 250 PS 637

## 2024-09-25 PROCEDURE — 250N000013 HC RX MED GY IP 250 OP 250 PS 637: Performed by: PHYSICIAN ASSISTANT

## 2024-09-25 PROCEDURE — 99239 HOSP IP/OBS DSCHRG MGMT >30: CPT | Performed by: PHYSICIAN ASSISTANT

## 2024-09-25 PROCEDURE — 36415 COLL VENOUS BLD VENIPUNCTURE: CPT | Performed by: PHYSICIAN ASSISTANT

## 2024-09-25 PROCEDURE — 99215 OFFICE O/P EST HI 40 MIN: CPT | Mod: 25 | Performed by: NURSE PRACTITIONER

## 2024-09-25 RX ORDER — LORAZEPAM 0.5 MG/1
0.5 TABLET ORAL EVERY 4 HOURS PRN
Qty: 15 TABLET | Refills: 0 | Status: SHIPPED | OUTPATIENT
Start: 2024-09-25

## 2024-09-25 RX ORDER — MORPHINE SULFATE 100 MG/5ML
2.5 SOLUTION ORAL
Qty: 15 ML | Refills: 0 | Status: SHIPPED | OUTPATIENT
Start: 2024-09-25

## 2024-09-25 RX ORDER — BISACODYL 10 MG
10 SUPPOSITORY, RECTAL RECTAL DAILY PRN
Qty: 2 SUPPOSITORY | Refills: 0 | Status: SHIPPED | OUTPATIENT
Start: 2024-09-25

## 2024-09-25 RX ORDER — HALOPERIDOL 0.5 MG/1
0.5 TABLET ORAL EVERY 6 HOURS PRN
Qty: 10 TABLET | Refills: 0 | Status: SHIPPED | OUTPATIENT
Start: 2024-09-25

## 2024-09-25 RX ORDER — ONDANSETRON 4 MG/1
4 TABLET, ORALLY DISINTEGRATING ORAL EVERY 6 HOURS PRN
Qty: 30 TABLET | Refills: 0 | Status: SHIPPED | OUTPATIENT
Start: 2024-09-25

## 2024-09-25 RX ORDER — ACETAMINOPHEN 650 MG/1
650 SUPPOSITORY RECTAL EVERY 4 HOURS PRN
Qty: 2 SUPPOSITORY | Refills: 0 | Status: SHIPPED | OUTPATIENT
Start: 2024-09-25

## 2024-09-25 RX ORDER — ATROPINE SULFATE 10 MG/ML
2 SOLUTION/ DROPS OPHTHALMIC 4 TIMES DAILY
Qty: 2 ML | Refills: 0 | Status: SHIPPED | OUTPATIENT
Start: 2024-09-25

## 2024-09-25 RX ADMIN — TAMSULOSIN HYDROCHLORIDE 0.4 MG: 0.4 CAPSULE ORAL at 08:37

## 2024-09-25 RX ADMIN — AMLODIPINE BESYLATE 10 MG: 10 TABLET ORAL at 08:37

## 2024-09-25 ASSESSMENT — ACTIVITIES OF DAILY LIVING (ADL)
ADLS_ACUITY_SCORE: 45
ADLS_ACUITY_SCORE: 45
ADLS_ACUITY_SCORE: 44
ADLS_ACUITY_SCORE: 45
ADLS_ACUITY_SCORE: 44
ADLS_ACUITY_SCORE: 45
ADLS_ACUITY_SCORE: 44
ADLS_ACUITY_SCORE: 45

## 2024-09-25 NOTE — CONSULTS
Care Management Discharge Note    Discharge Date: 09/26/2024     Discharge Disposition: Home    Discharge Services: Hospice     Discharge DME: Hospital bed, bedside table, commode, chux, gloves, urinal, wipes, mouth swabs    Discharge Transportation: family will provide    Private pay costs discussed: Not applicable    Does the patient's insurance plan have a 3 day qualifying hospital stay waiver?  No    Patient/family educated on Medicare website which has current facility and service quality ratings: Yes    Education Provided on the Discharge Plan: Yes   Persons Notified of Discharge Plans: Patient, family  Patient/Family in Agreement with the Plan: Yes     Handoff Referral Completed: No, handoff not indicated or clinically appropriate    Additional Information:  SW consulted for discharge planning. Updated by Palliative care that family has elected to have pt return home with hospice support.     SW met with patient and several family members at bedside. They are in agreement with plan. Confirmed demographics and discharge address. Family does not have a hospice agency preference. With their permission, referral sent to [facility]. They have accepted the referral and will order DME to be delivered to patient's home today. They will call patient's daughter Luzma ([phone]) to coordinate hospice intake at home. If Luzma is unable to be reached, family requested that secondary contact be daughter Kristen ([phone]), or family member Leo ([phone]). [facility] updated.     Comfort meds are being filled and sent with patient. Family to transport home.     SW will remain available for any further needs.     ALBERTO Marroquin, [facility]   Inpatient Care Coordination  [facility]   [phone]

## 2024-09-25 NOTE — PLAN OF CARE
PRIMARY DIAGNOSIS: GI BLEED    OUTPATIENT/OBSERVATION GOALS TO BE MET BEFORE DISCHARGE  Orthostatic performed: N/A    Stable Hgb Yes.   Recent Labs   Lab Test 09/25/24  0549 09/24/24  2155 09/24/24  1502   HGB 9.9* 9.9* 10.0*       Resolved or declined bleeding episodes: Yes Last episode: 09/24/24 @3pm    Appropriate testing complete: Yes    Cleared for discharge by consultants (if involved): No    Safe discharge environment identified: Yes    Discharge Planner Nurse   Safe discharge environment identified: Yes  Barriers to discharge: Yes       Entered by: Chaya Calderon RN 09/25/2024      Please review provider order for any additional goals.   Nurse to notify provider when observation goals have been met and patient is ready for discharge.      Goal Outcome Evaluation:      Plan of Care Reviewed With: patient    Overall Patient Progress: improvingOverall Patient Progress: improving    Outcome Evaluation: Pt is A&OX4, RA, denies pain. No stool since yesterday afternoon @3pm. Family is at bedside serving as interpreters. Family is requesting for [place] interpretor instead of family member while trying to determine pt's future POC. Pt is SBA, tolerating a regular diet. IV saline locked. Awaiting palliative and social work consults to determine iof going home hospice or further interventions needed.

## 2024-09-25 NOTE — PROGRESS NOTES
PALLIATIVE CARE PROGRESS NOTE  [facility]     Patient Name: Jessica Linda  Date of Admission: 9/23/2024   Today the patient was seen for: goals of care     Recommendations & Counseling     GOALS OF CARE:   Home on hospice today with family  They do not want colonoscopy, they would not want cancer targeted therapies if it was cancer     ADVANCE CARE PLANNING:  No health care directive on file. Per system policy, Surrogate Decision-makers for Patients With Diminished Decision-making Capacity offers guidance on possible decision-makers. Kristen (daughter) has been identified as a surrogate decision maker.   There is no POLST form on file, defer to patient and/or next of kin for decisions   Code status: No CPR- Do NOT Intubate readdressed with patient today and he reports that when his heart stops he is ok to be in peace.     MEDICAL MANAGEMENT:   #General Weakness  Appreciate the staff for assistance with ADLs     PSYCHOSOCIAL/SPIRITUAL SUPPORT:  Family has 7 adult children, 18 grandchildren that are involved    Palliative Care will continue to follow. Thank you for the consult and allowing us to aid in the care of Jessica Linda.    These recommendations have been discussed with medical team.    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               Interval History:     Per patient or family/caregivers today:  Met with patient and three daughters and three grandchildren. We discussed his condition and what to expect in the the coming weeks. We talked about further hospitalizations, initially patient stated he is willing to come back if we can stop the bleeding, we talked about how he is likely to rebleed and without treatments for  the cause we likely will not be able to fix it. He then said if there isn't anything more that can be done he is wanting hospice and to die at home with his family. We talked about blood transfusions and that since he is stable this is not a concern at this time and again won't likely fix his main issue. He and family are in agreement with hospice, dnr dni- both of which were discussed at length.             Review of Systems:     Besides above, an additional system ROS was reviewed and is unremarkable               Physical Exam:   Temp: 98.2  F (36.8  C) Temp src: Oral Temp  Min: 97.9  F (36.6  C)  Max: 98.4  F (36.9  C) BP: (!) 139/90 Pulse: 99   Resp: 16 SpO2: 96 % O2 Device: None (Room air)    Vital Signs with Ranges  Temp:  [97.9  F (36.6  C)-98.4  F (36.9  C)] 98.2  F (36.8  C)  Pulse:  [89-99] 99  Resp:  [14-16] 16  BP: (118-139)/(76-90) 139/90  SpO2:  [94 %-96 %] 96 %  129 lbs 13.62 oz    EXAM:  Constitutional: alert and no distress   Cardiovascular: negative  Respiratory: negative  Psychiatric: mentation appears normal and affect normal/bright  Abdomen: Abdomen soft, non-tender. BS normal. No masses, organomegaly               Current Problem List:   Active Problems:    Hematochezia    Dizziness    Generalized weakness    Dyspnea    Elevated troponin    Elevated d-dimer      Allergies   Allergen Reactions    Ibuprofen Other (See Comments)            Data Reviewed:       Results for orders placed or performed during the hospital encounter of 09/23/24 (from the past 24 hour(s))   Glucose by meter   Result Value Ref Range    GLUCOSE BY METER POCT 131 (H) 70 - 99 mg/dL   Hemoglobin   Result Value Ref Range    Hemoglobin 10.0 (L) 13.3 - 17.7 g/dL   Hemoglobin   Result Value Ref Range    Hemoglobin 9.9 (L) 13.3 - 17.7 g/dL   Glucose by meter   Result Value Ref Range    GLUCOSE BY METER POCT 159 (H) 70 - 99 mg/dL   Hemoglobin   Result Value Ref Range    Hemoglobin 9.9 (L) 13.3 - 17.7 g/dL          Medical  Decision Making       Medical complexity over the past 24 hours:  - Decision to DE-ESCALATE CARE based on prognosis  60 MINUTES SPENT BY ME on the date of service doing chart review, history, exam, documentation & further activities per the note.      Advance Care Planning Discussion 9/25/2024. Azucena RICKETTS NP met with Patient and their family today at the hospital to discuss Advance Care Planning. Jessica Linda does have decisional capacity and was present for this discussion.  Those present were informed of the voluntary nature of this discussion and wished to proceed.  The discussion included:  wishes and worries, pain control, hospice on discharge, family understanding, goals of care, code status, functional status . This discussion began at 0945 and ended at 1010 for a total of 30 minutes.

## 2024-09-25 NOTE — PLAN OF CARE
"Goal Outcome Evaluation:      Plan of Care Reviewed With: patient    Overall Patient Progress: improvingOverall Patient Progress: improving    Outcome Evaluation: Pt denies pain, no stool during this shift, family at bedside interprets ad pat speaks [place]. family requests to use hospital [place] interpretor insteady of family member as they will decide about palliative care for pt.    /89   Pulse 92   Temp 98.3  F (36.8  C) (Oral)   Resp 16   Ht 1.575 m (5' 2\")   Wt 58.9 kg (129 lb 13.6 oz)   SpO2 95%   BMI 23.75 kg/m      Problem: Adult Inpatient Plan of Care  Goal: Plan of Care Review  Description: The Plan of Care Review/Shift note should be completed every shift.  The Outcome Evaluation is a brief statement about your assessment that the patient is improving, declining, or no change.  This information will be displayed automatically on your shift  note.  Outcome: Progressing  Flowsheets (Taken 9/25/2024 0725)  Outcome Evaluation: Pt denies pain, no stool during this shift, family at bedside interprets ad pat speaks [place]ran. family requests to use hospital [place] interpretor insteady of family member as they will decide about palliative care for pt.  Plan of Care Reviewed With: patient  Overall Patient Progress: improving  Goal: Patient-Specific Goal (Individualized)  Description: You can add care plan individualizations to a care plan. Examples of Individualization might be:  \"Parent requests to be called daily at 9am for status\", \"I have a hard time hearing out of my right ear\", or \"Do not touch me to wake me up as it startles  me\".  Outcome: Progressing  Goal: Absence of Hospital-Acquired Illness or Injury  Outcome: Progressing  Intervention: Prevent Skin Injury  Recent Flowsheet Documentation  Taken 9/25/2024 0000 by Desiree To RN  Body Position: position changed independently  Intervention: Prevent and Manage VTE (Venous Thromboembolism) Risk  Recent Flowsheet " Documentation  Taken 9/25/2024 0000 by Desiree To RN  VTE Prevention/Management: SCDs off (sequential compression devices)  Intervention: Prevent Infection  Recent Flowsheet Documentation  Taken 9/25/2024 0201 by Desiree To RN  Infection Prevention:   cohorting utilized   rest/sleep promoted   single patient room provided   hand hygiene promoted  Taken 9/25/2024 0000 by Desiree To RN  Infection Prevention:   cohorting utilized   rest/sleep promoted   single patient room provided   hand hygiene promoted  Goal: Optimal Comfort and Wellbeing  Outcome: Progressing  Goal: Readiness for Transition of Care  Outcome: Progressing     Problem: Fall Injury Risk  Goal: Absence of Fall and Fall-Related Injury  Outcome: Progressing     Problem: Infection  Goal: Absence of Infection Signs and Symptoms  Outcome: Progressing     Problem: Pain Acute  Goal: Optimal Pain Control and Function  Outcome: Progressing

## 2024-09-25 NOTE — PLAN OF CARE
"PRIMARY DIAGNOSIS: Hematochezia/SOB  OUTPATIENT/OBSERVATION GOALS TO BE MET BEFORE DISCHARGE:  ADLs back to baseline: No     Activity and level of assistance: Assist x 1 W/G     Pain status: Pain free.     Return to near baseline physical activity: No             Discharge Planner Nurse  Safe discharge environment identified: Yes  Barriers to discharge: Yes         Patient A & O x 4, Belarusian speaking, daughter in the room translating. Patient is bilaterally blind. Lung Sounds CTA, BS active. LBM 9/24, continent of bladder. Denies pain/SOB. PIV SL. Family in the room assisting patient. Plan is to continue monitoring patient's Hgb levels. Another plan is discharging patient home in hospice when medically cleared. Family is talking amongst themselves about this plan. PT, SW & Palliative are following. Will continue to monitor.    /86 (BP Location: Left arm)   Pulse 89   Temp 98.4  F (36.9  C) (Oral)   Resp 16   Ht 1.575 m (5' 2\")   Wt 58.9 kg (129 lb 13.6 oz)   SpO2 96%   BMI 23.75 kg/m         Problem: Adult Inpatient Plan of Care  Goal: Plan of Care Review  Description: The Plan of Care Review/Shift note should be completed every shift.  The Outcome Evaluation is a brief statement about your assessment that the patient is improving, declining, or no change.  This information will be displayed automatically on your shift  note.  Outcome: Progressing  Goal: Patient-Specific Goal (Individualized)  Description: You can add care plan individualizations to a care plan. Examples of Individualization might be:  \"Parent requests to be called daily at 9am for status\", \"I have a hard time hearing out of my right ear\", or \"Do not touch me to wake me up as it startles  me\".  Outcome: Progressing  Goal: Absence of Hospital-Acquired Illness or Injury  Outcome: Progressing  Intervention: Identify and Manage Fall Risk  Recent Flowsheet Documentation  Taken 9/24/2024 1811 by Brittany Pineda RN  Safety Promotion/Fall " Prevention:   activity supervised   lighting adjusted   mobility aid in reach   nonskid shoes/slippers when out of bed  Intervention: Prevent Skin Injury  Recent Flowsheet Documentation  Taken 9/24/2024 1811 by Brittany Pineda RN  Body Position: position changed independently  Intervention: Prevent and Manage VTE (Venous Thromboembolism) Risk  Recent Flowsheet Documentation  Taken 9/24/2024 1811 by Brittany Pineda RN  VTE Prevention/Management: SCDs off (sequential compression devices)  Intervention: Prevent Infection  Recent Flowsheet Documentation  Taken 9/24/2024 1811 by Brittany Pineda RN  Infection Prevention:   cohorting utilized   rest/sleep promoted   single patient room provided   hand hygiene promoted  Goal: Optimal Comfort and Wellbeing  Outcome: Progressing  Goal: Readiness for Transition of Care  Outcome: Progressing

## 2024-09-25 NOTE — PLAN OF CARE
"PRIMARY DIAGNOSIS: Hematochezia/SOB  OUTPATIENT/OBSERVATION GOALS TO BE MET BEFORE DISCHARGE:  ADLs back to baseline: No     Activity and level of assistance: Assist x 1 W/G     Pain status: Pain free.     Return to near baseline physical activity: No             Discharge Planner Nurse  Safe discharge environment identified: Yes  Barriers to discharge: Yes         Patient A & O x 4, Urdu speaking, daughter in the room translating. Patient is bilaterally blind. Lung Sounds CTA, BS active. LBM 9/24, continent of bladder. Denies pain/SOB. PIV SL. Family in the room assisting patient. Plan is to continue monitoring patient's Hgb levels. Another plan is discharging patient home in hospice when medically cleared. Family is talking amongst themselves about this plan. PT, SW & Palliative are following. Will continue to monitor.    /80 (BP Location: Left arm)   Pulse 90   Temp 98.1  F (36.7  C) (Oral)   Resp 16   Ht 1.575 m (5' 2\")   Wt 58.9 kg (129 lb 13.6 oz)   SpO2 94%   BMI 23.75 kg/m         Problem: Adult Inpatient Plan of Care  Goal: Plan of Care Review  Description: The Plan of Care Review/Shift note should be completed every shift.  The Outcome Evaluation is a brief statement about your assessment that the patient is improving, declining, or no change.  This information will be displayed automatically on your shift  note.  Outcome: Progressing  Goal: Patient-Specific Goal (Individualized)  Description: You can add care plan individualizations to a care plan. Examples of Individualization might be:  \"Parent requests to be called daily at 9am for status\", \"I have a hard time hearing out of my right ear\", or \"Do not touch me to wake me up as it startles  me\".  Outcome: Progressing  Goal: Absence of Hospital-Acquired Illness or Injury  Outcome: Progressing  Intervention: Identify and Manage Fall Risk  Recent Flowsheet Documentation  Taken 9/24/2024 1811 by Brittany Pineda RN  Safety Promotion/Fall " Prevention:   activity supervised   lighting adjusted   mobility aid in reach   nonskid shoes/slippers when out of bed  Intervention: Prevent Skin Injury  Recent Flowsheet Documentation  Taken 9/24/2024 1811 by Brittany Pineda RN  Body Position: position changed independently  Intervention: Prevent and Manage VTE (Venous Thromboembolism) Risk  Recent Flowsheet Documentation  Taken 9/24/2024 1811 by Brittany Pineda RN  VTE Prevention/Management: SCDs off (sequential compression devices)  Intervention: Prevent Infection  Recent Flowsheet Documentation  Taken 9/24/2024 1811 by Brittany Pineda RN  Infection Prevention:   cohorting utilized   rest/sleep promoted   single patient room provided   hand hygiene promoted  Goal: Optimal Comfort and Wellbeing  Outcome: Progressing  Goal: Readiness for Transition of Care  Outcome: Progressing

## 2024-09-25 NOTE — DISCHARGE SUMMARY
[facility] Discharge Summary          Jessica Linda MRN# [id number]   Age: 89 year old YOB: 1932     Date of Admission:  9/23/2024  Date of Discharge::  9/25/2024  Admitting Physician:  Alyse Black MD  Discharge Physician:  Karo Mendez PA-C  Primary Physician: Wilmer Mathew     Primary Discharge Diagnoses:   Rectal Bleeding  Acute Blood Loss Anemia  Suspected Colon Cancer    Hospital Course:   For detail history, please refer to H & P from 9/23/2024. 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      Rectal Bleeding  Acute Blood Loss Anemia  Suspected Colon Cancer  Pt presented with rectal bleeding.  No prior colonoscopy.  CT revealed a possible circumferential sigmoid colon lesion compatible with malignancy.  Hgb on admission 12.0 which stabilized at 10   - GI consulted and patient and family declined colonoscopy and noted that they would not want to pursue treatment  - Palliative Care consulted to assist with goals of care and patient was discharged on home hospice  - SW consulted to assist with arranging home hospice     Choledocholithiasis  Cholelithiasis  Incidental finding on imaging.  LFTs were normal and the patient denied any abdominal pain.  He was not interested in pursuing an ERCP.   No signs of cholangitis     Shortness of breath  Chest pressure   Elevated D-dimer  Ventilation defect on VQ scan  Chest x-ray showed no infiltrate, effusion or pneumothorax, mild cardiomegaly. VQ scan was performed due to shortness of breath and elevated dimer which did not suggest any acute PE however it did show a moderate size wedge-shaped ventilation defect involving the anterior superior left upper lobe.  CT chest was negative for mass or LAD.  Patient remained asymptomatic during his hospital stay.  No further work up;  discharged on hospice.     CKD stage IV  Established care with nephrology outpatient in June 2024 due to increase in creatinine to 3.50. Suspected due to diabetes and HTN. Per nephrology note in June unclear etiology for worsening of kidney function. Renal US was ordered showed atrophic, echogenic appearance consistent with chronic medical renal disease, small cortical renal cyst, no renal mass or hydronephrosis.       Elevated troponin  Troponin initially 44 followed by 38.  Was also chronically elevated last year during admission at 36 and 32.  Suspect elevation due to CKD. EKG showed sinus tachycardia.  Patient remained asymptomatic during his hospital stay.  Low suspicion for ACS     Type II DM  - discontinued glipizide at discharge with poor oral intake     HTN  HLD  - discontinued statin and Amlodipine at discharge     BPH  - continued flomax     Blindness in both eyes    Procedures/Imaging:     Results for orders placed or performed during the hospital encounter of 09/23/24   Chest XR,  PA & LAT    Narrative    XR CHEST 2 VIEWS 9/23/2024 10:47 AM    HISTORY: sob    COMPARISON: 10/16/2023       Impression    IMPRESSION: No infiltrate, pleural effusion or pneumothorax. Mild  cardiomegaly.    SOL OH MD         SYSTEM ID:  [id number]   NM Lung Scan Ventilation and Perfusion    Narrative    EXAM: NM LUNG SCAN VENTILATION AND PERFUSION  LOCATION: [facility]  DATE: 9/23/2024    INDICATION: Shortness of breath. Elevated d-dimer. Dizziness.  COMPARISON: Chest x-ray dated 9/23/2024.  TECHNIQUE: 65.0 mCi Tc-99m DTPA inhaled. 6.5 mCi Tc-99m MAA, IV. Standard planar imaging during perfusion and ventilation portions of exam.    FINDINGS: Moderate-sized wedge-shaped ventilation and defect involving the anterosuperior left upper lobe. The remaining ventilation/perfusion is normal comment specifically no mismatched defects are identified to suggest acute pulmonary embolism.      Impression     IMPRESSION:    1. No mismatched perfusion defects to suggest acute pulmonary embolism.    2. Moderate-sized wedge-shaped ventilation and defect involving the anterosuperior left upper lobe. While this may represent sequelae of a chronic pulmonary embolus, the exact etiology remains indeterminate.   CT Chest Abdomen Pelvis w/o Contrast    Narrative    EXAM: CT CHEST ABDOMEN PELVIS W/O CONTRAST  LOCATION: [facility]  DATE: 9/23/2024    INDICATION: Rectal bleeding, abdominal bloating, shortness of breath.  COMPARISON: None.  TECHNIQUE: CT scan of the chest, abdomen, and pelvis was performed without IV contrast. Multiplanar reformats were obtained. Dose reduction techniques were used.   CONTRAST: None.    FINDINGS:   LUNGS AND PLEURA: No masses or effusions. Trace atelectasis.    MEDIASTINUM/AXILLAE: No adenopathy demonstrated in the absence of contrast. No aneurysm. Small fat-containing hiatal hernia.    CORONARY ARTERY CALCIFICATION: Moderate.    HEPATOBILIARY: Normal contour with no significant mass. Calcified gallstones. Multiple stones in the common bile duct compatible with choledocholithiasis. No biliary dilatation.    PANCREAS: No significant mass, duct dilatation, or inflammatory change.    SPLEEN: Normal size.    ADRENAL GLANDS: No significant nodules.    KIDNEYS/BLADDER: No significant mass, stone, or hydronephrosis.    BOWEL: No inflammation. Question sigmoid colon lesion centered on image 258 series 3. No obstruction.    LYMPH NODES: No adenopathy demonstrated in the absence of contrast.    VASCULATURE: There are moderate atherosclerotic changes of the visualized aorta and its branches. There is no evidence of aortic aneurysm.    PELVIC ORGANS: No pelvic masses.    MUSCULOSKELETAL: No frankly destructive bony lesions.      Impression    IMPRESSION:  1.  Possible circumferential sigmoid colon lesion compatible with malignancy. Direct visualization recommended.  2.  Cholelithiasis and  "choledocholithiasis without biliary dilatation.     Allergies:     Allergies   Allergen Reactions    Ibuprofen Other (See Comments)        Subjective:   Patient seen with multiple family members at the bedside.  Patient denies any abdominal pain.  Last blood in the stool was yesterday at 3pm.  Tolerated a regular diet.  I answered all questions from multiple family members.  Palliative Care and SW to meet with family as well today.    Physical Exam:   Blood pressure (!) 139/90, pulse 99, temperature 98.2  F (36.8  C), temperature source Oral, resp. rate 16, height 1.575 m (5' 2\"), weight 58.9 kg (129 lb 13.6 oz), SpO2 96%.  General: sleeping, easily arousable  HEENT: AT/NC  Resp: clear to auscultation bilaterally, no crackles or wheezes  Cardiac: regular rate and rhythm, no murmur  Abdomen: Soft, nontender, nondistended. +BS.   Extremities: No LE edema  Skin: Warm and dr  Neuro: Alert & follows commands     Discharge Medicatios:        Current Discharge Medication List        START taking these medications    Details   acetaminophen (TYLENOL) 650 MG suppository Place 1 suppository (650 mg) rectally every 4 hours as needed for fever.  Qty: 2 suppository, Refills: 0    Associated Diagnoses: Hospice care patient      atropine 1 % ophthalmic solution Take 2 drops by mouth, place under tongue or place inside cheek 4 times daily.  Qty: 2 mL, Refills: 0    Associated Diagnoses: Hospice care patient      bisacodyl (DULCOLAX) 10 MG suppository Place 1 suppository (10 mg) rectally daily as needed for constipation.  Qty: 2 suppository, Refills: 0    Associated Diagnoses: Hospice care patient      haloperidol (HALDOL) 0.5 MG tablet Take 1 tablet (0.5 mg) by mouth every 6 hours as needed for agitation.  Qty: 10 tablet, Refills: 0    Associated Diagnoses: Hospice care patient      LORazepam (ATIVAN) 0.5 MG tablet Take 1 tablet (0.5 mg) by mouth every 4 hours as needed for anxiety.  Qty: 15 tablet, Refills: 0    Associated " Diagnoses: Hospice care patient      morphine sulfate, high concentrate, (ROXANOL-CONCENTRATED) 20 MG/ML concentrated solution Take 0.125 mLs (2.5 mg) by mouth every 2 hours as needed for shortness of breath or breakthrough pain.  Qty: 15 mL, Refills: 0    Associated Diagnoses: Hospice care patient      ondansetron (ZOFRAN ODT) 4 MG ODT tab Take 1 tablet (4 mg) by mouth every 6 hours as needed for nausea or vomiting.  Qty: 30 tablet, Refills: 0    Associated Diagnoses: Hospice care patient           CONTINUE these medications which have NOT CHANGED    Details   acetaminophen (TYLENOL) 500 MG tablet Take 500-1,000 mg by mouth every 4 hours as needed for mild pain      guaiFENesin-codeine (ROBITUSSIN AC) 100-10 MG/5ML solution Take 5 mLs by mouth nightly as needed for cough      LANsoprazole (PREVACID) 30 MG DR capsule Take 30 mg by mouth daily      polyethylene glycol (MIRALAX) 17 g packet Take 1 packet by mouth daily as needed for constipation      tamsulosin (FLOMAX) 0.4 MG capsule Take 0.4 mg by mouth daily      triamcinolone (KENALOG) 0.1 % external ointment Apply topically 2 times daily as needed for irritation.           STOP taking these medications       amLODIPine (NORVASC) 10 MG tablet Comments:   Reason for Stopping:         glipiZIDE (GLUCOTROL XL) 2.5 MG 24 hr tablet Comments:   Reason for Stopping:         simvastatin (ZOCOR) 20 MG tablet Comments:   Reason for Stopping:               Instructions Given to Patient as Discharge:     Discharge Procedure Orders   Hospice Referral   Standing Status: Future   Referral Priority: Routine: Next available opening Referral Type: Consultation   Number of Visits Requested: 1     Reason for your hospital stay   Order Comments: You were hospitalized due to Rectal Bleeding,  Acute Blood Loss Anemia due to Suspected Colon Cancer  - GI consulted and patient and family have declined colonoscopy and noted that they would not want to pursue further treatment  - Palliative  Care was consulted and you were discharged on home hospice  -  consult was consulted to assist with arranging a hospice agency     Follow-up and recommended labs and tests    Order Comments: Follow up with PCP as needed     Activity   Order Comments: Your activity upon discharge: activity as tolerated     Order Specific Question Answer Comments   Is discharge order? Yes      Diet   Order Comments: Follow this diet upon discharge: Current Diet:Orders Placed This Encounter      Regular Diet Adult     Order Specific Question Answer Comments   Is discharge order? Yes        Pending Tests at Discharge:   none    Discharge Disposition:     Discharged to home     Karo VELASQUEZ-C  Hospitalist Service  Pager [phone]    >30 minutes was spent in discharge planning, care coordination, physical examination and medication reconciliation on the date of discharge, 9/25/2024

## 2024-09-25 NOTE — PLAN OF CARE
Patient's After Visit Summary was reviewed with patient and/or family.   Patient verbalized understanding of After Visit Summary, recommended follow up and was given an opportunity to ask questions.   Discharge medications sent home with patient/family: YES   Discharged with daughters and grandchildren

## 2024-09-25 NOTE — PLAN OF CARE
Physical Therapy Discharge Summary    Reason for therapy discharge:    Discharged to home. With Hospice.    Progress towards therapy goal(s). See goals on Care Plan in Epic electronic health record for goal details.  Goals not met.  Barriers to achieving goals:   discharge from facility.    Therapy recommendation(s):    No further therapy is recommended. Family to provide support and assist as needed.    Goal Outcome Evaluation: